# Patient Record
Sex: MALE | Race: WHITE | NOT HISPANIC OR LATINO | Employment: OTHER | URBAN - METROPOLITAN AREA
[De-identification: names, ages, dates, MRNs, and addresses within clinical notes are randomized per-mention and may not be internally consistent; named-entity substitution may affect disease eponyms.]

---

## 2017-02-06 DIAGNOSIS — I10 ESSENTIAL HYPERTENSION: ICD-10-CM

## 2017-02-06 RX ORDER — AMLODIPINE BESYLATE 10 MG/1
TABLET ORAL
Qty: 30 TABLET | Refills: 1 | OUTPATIENT
Start: 2017-02-06

## 2017-02-09 ENCOUNTER — PATIENT MESSAGE (OUTPATIENT)
Dept: FAMILY MEDICINE | Facility: CLINIC | Age: 47
End: 2017-02-09

## 2017-02-09 DIAGNOSIS — I10 ESSENTIAL HYPERTENSION: ICD-10-CM

## 2017-02-09 RX ORDER — AMLODIPINE BESYLATE 10 MG/1
TABLET ORAL
Qty: 30 TABLET | Refills: 1 | Status: CANCELLED | OUTPATIENT
Start: 2017-02-09

## 2017-02-09 RX ORDER — HYDROCHLOROTHIAZIDE 25 MG/1
25 TABLET ORAL DAILY
Qty: 30 TABLET | Refills: 11 | OUTPATIENT
Start: 2017-02-09

## 2017-02-09 RX ORDER — AMLODIPINE BESYLATE 10 MG/1
10 TABLET ORAL DAILY
Qty: 30 TABLET | Refills: 2 | Status: SHIPPED | OUTPATIENT
Start: 2017-02-09 | End: 2017-05-09 | Stop reason: SDUPTHER

## 2017-02-09 RX ORDER — IRBESARTAN 150 MG/1
150 TABLET ORAL NIGHTLY
Qty: 30 TABLET | Refills: 2 | Status: SHIPPED | OUTPATIENT
Start: 2017-02-09 | End: 2017-05-15 | Stop reason: SDUPTHER

## 2017-03-28 ENCOUNTER — PATIENT MESSAGE (OUTPATIENT)
Dept: FAMILY MEDICINE | Facility: CLINIC | Age: 47
End: 2017-03-28

## 2017-05-05 DIAGNOSIS — I10 ESSENTIAL HYPERTENSION: ICD-10-CM

## 2017-05-05 RX ORDER — AMLODIPINE BESYLATE 10 MG/1
TABLET ORAL
Qty: 30 TABLET | Refills: 2 | OUTPATIENT
Start: 2017-05-05

## 2017-05-08 ENCOUNTER — PATIENT MESSAGE (OUTPATIENT)
Dept: FAMILY MEDICINE | Facility: CLINIC | Age: 47
End: 2017-05-08

## 2017-05-09 DIAGNOSIS — I10 ESSENTIAL HYPERTENSION: ICD-10-CM

## 2017-05-09 RX ORDER — AMLODIPINE BESYLATE 10 MG/1
10 TABLET ORAL DAILY
Qty: 30 TABLET | Refills: 0 | Status: SHIPPED | OUTPATIENT
Start: 2017-05-09 | End: 2017-05-15 | Stop reason: SDUPTHER

## 2017-05-15 ENCOUNTER — OFFICE VISIT (OUTPATIENT)
Dept: FAMILY MEDICINE | Facility: CLINIC | Age: 47
End: 2017-05-15
Payer: COMMERCIAL

## 2017-05-15 VITALS
WEIGHT: 292.56 LBS | BODY MASS INDEX: 40.96 KG/M2 | TEMPERATURE: 98 F | DIASTOLIC BLOOD PRESSURE: 87 MMHG | HEIGHT: 71 IN | SYSTOLIC BLOOD PRESSURE: 124 MMHG | HEART RATE: 87 BPM

## 2017-05-15 DIAGNOSIS — E66.01 MORBID OBESITY DUE TO EXCESS CALORIES: ICD-10-CM

## 2017-05-15 DIAGNOSIS — Z00.00 ROUTINE HISTORY AND PHYSICAL EXAMINATION OF ADULT: Primary | ICD-10-CM

## 2017-05-15 DIAGNOSIS — I10 ESSENTIAL HYPERTENSION: ICD-10-CM

## 2017-05-15 PROCEDURE — 99999 PR PBB SHADOW E&M-EST. PATIENT-LVL III: CPT | Mod: PBBFAC,,, | Performed by: FAMILY MEDICINE

## 2017-05-15 PROCEDURE — 3074F SYST BP LT 130 MM HG: CPT | Mod: S$GLB,,, | Performed by: FAMILY MEDICINE

## 2017-05-15 PROCEDURE — 99396 PREV VISIT EST AGE 40-64: CPT | Mod: S$GLB,,, | Performed by: FAMILY MEDICINE

## 2017-05-15 PROCEDURE — 3079F DIAST BP 80-89 MM HG: CPT | Mod: S$GLB,,, | Performed by: FAMILY MEDICINE

## 2017-05-15 RX ORDER — IRBESARTAN 150 MG/1
150 TABLET ORAL NIGHTLY
Qty: 30 TABLET | Refills: 5 | Status: CANCELLED | OUTPATIENT
Start: 2017-05-15

## 2017-05-15 RX ORDER — HYDROCHLOROTHIAZIDE 25 MG/1
25 TABLET ORAL DAILY
Qty: 90 TABLET | Refills: 3 | Status: SHIPPED | OUTPATIENT
Start: 2017-05-15 | End: 2018-06-29 | Stop reason: ALTCHOICE

## 2017-05-15 RX ORDER — AMLODIPINE BESYLATE 10 MG/1
10 TABLET ORAL DAILY
Qty: 90 TABLET | Refills: 3 | Status: SHIPPED | OUTPATIENT
Start: 2017-05-15 | End: 2018-07-05 | Stop reason: SDUPTHER

## 2017-05-15 RX ORDER — IRBESARTAN 150 MG/1
150 TABLET ORAL NIGHTLY
Qty: 90 TABLET | Refills: 3 | Status: SHIPPED | OUTPATIENT
Start: 2017-05-15 | End: 2018-02-16 | Stop reason: ALTCHOICE

## 2017-05-15 NOTE — MR AVS SNAPSHOT
[facility]  [address] [address]  Phone: [phone]  Fax: [phone]                  Yoan Thomason   5/15/2017 8:40 AM   Office Visit    Description:  Male : 1970   Provider:  Stefan Deleon MD   Department:  [facility]           Reason for Visit     Annual Exam           Diagnoses this Visit        Comments    Routine history and physical examination of adult    -  Primary     Essential hypertension         Morbid obesity due to excess calories                To Do List           Goals (5 Years of Data)     None       These Medications        Disp Refills Start End    amlodipine (NORVASC) 10 MG tablet 90 tablet 3 5/15/2017     Take 1 tablet (10 mg total) by mouth once daily. - Oral    Pharmacy: [address] Ph #: [phone]       Notes to Pharmacy: Generic For:NORVASC 10 MG TABLET    hydrochlorothiazide (HYDRODIURIL) 25 MG tablet 90 tablet 3 5/15/2017     Take 1 tablet (25 mg total) by mouth once daily. - Oral    Pharmacy: [address] Ph #: [phone]       Notes to Pharmacy: Generic For:*HYDRODIURIL 25 MG TABLET  2016 1:04:10 PM    irbesartan (AVAPRO) 150 MG tablet 90 tablet 3 5/15/2017     Take 1 tablet (150 mg total) by mouth every evening. - Oral    Pharmacy: [address] Ph #: [phone]       Notes to Pharmacy: Generic For:AVAPRO 150 MG TABLET      [facility]     [facility] Care Line - 24/ Assistance  Unless otherwise directed by your provider, please contact [facility], our nurse care line that is available for  assistance.     Registered nurses in the [facility] provide: appointment scheduling, clinical advisement, health education, and other advisory services.  Call: [phone] (toll free)               Medications          "  Message regarding Medications     Verify the changes and/or additions to your medication regime listed below are the same as discussed with your clinician today.  If any of these changes or additions are incorrect, please notify your healthcare provider.        CHANGE how you are taking these medications     Start Taking Instead of    hydrochlorothiazide (HYDRODIURIL) 25 MG tablet hydrochlorothiazide (HYDRODIURIL) 25 MG tablet    Dosage:  Take 1 tablet (25 mg total) by mouth once daily. Dosage:  TAKE 1 TABLET BY MOUTH ONCE DAILY.    Reason for Change:  Reorder            Verify that the below list of medications is an accurate representation of the medications you are currently taking.  If none reported, the list may be blank. If incorrect, please contact your healthcare provider. Carry this list with you in case of emergency.           Current Medications     amlodipine (NORVASC) 10 MG tablet Take 1 tablet (10 mg total) by mouth once daily.    aspirin (ECOTRIN) 81 MG EC tablet Take 81 mg by mouth once daily.    hydrochlorothiazide (HYDRODIURIL) 25 MG tablet Take 1 tablet (25 mg total) by mouth once daily.    irbesartan (AVAPRO) 150 MG tablet Take 1 tablet (150 mg total) by mouth every evening.           Clinical Reference Information           Your Vitals Were     BP Pulse Temp Height Weight BMI    124/87 87 98.2 °F (36.8 °C) 5' 10.5" (1.791 m) 132.7 kg (292 lb 8.8 oz) 41.38 kg/m2      Blood Pressure          Most Recent Value    BP  124/87      Allergies as of 5/15/2017     No Known Allergies      Immunizations Administered on Date of Encounter - 5/15/2017     None      Orders Placed During Today's Visit     Future Labs/Procedures Expected by Expires    Basic metabolic panel  5/15/2017 1/22/2031    Lipid panel  5/15/2017 (Approximate) 5/15/2018      Language Assistance Services     ATTENTION: Language assistance services are available, free of charge. Please call [phone].      ATENCIÓN: Bridgett pickering, " tiene a cortez disposición servicios gratuitos de asistencia lingüística. Llmatt al [phone].     JENNIFER Ý: N?u b?n nói Ti?ng Vi?t, có các d?ch v? h? tr? ngôn ng? mi?n phí dành cho b?n. G?i s? [phone].         [facility] complies with applicable Federal civil rights laws and does not discriminate on the basis of race, color, national origin, age, disability, or sex.

## 2017-05-16 NOTE — PROGRESS NOTES
Patient presents physical exam follow-up hypertension, morbid obesity.  Difficulty losing weight.  Compliant with medication.    Past Medical History:  Past Medical History:   Diagnosis Date    Morbid obesity 5/15/2017     Past Surgical History:   Procedure Laterality Date    sinus surgery x5 yrs       Review of patient's allergies indicates:  No Known Allergies  Current Outpatient Prescriptions on File Prior to Visit   Medication Sig Dispense Refill    aspirin (ECOTRIN) 81 MG EC tablet Take 81 mg by mouth once daily.      [DISCONTINUED] amlodipine (NORVASC) 10 MG tablet Take 1 tablet (10 mg total) by mouth once daily. 30 tablet 0    [DISCONTINUED] hydrochlorothiazide (HYDRODIURIL) 25 MG tablet TAKE 1 TABLET BY MOUTH ONCE DAILY. 30 tablet 11    [DISCONTINUED] irbesartan (AVAPRO) 150 MG tablet Take 1 tablet (150 mg total) by mouth every evening. 30 tablet 2     No current facility-administered medications on file prior to visit.      Social History     Social History    Marital status:      Spouse name: N/A    Number of children: N/A    Years of education: N/A     Occupational History    Not on file.     Social History Main Topics    Smoking status: Never Smoker    Smokeless tobacco: Not on file    Alcohol use No    Drug use: No    Sexual activity: Not on file     Other Topics Concern    Not on file     Social History Narrative     Family History   Problem Relation Age of Onset    Diabetes Mother     Hypertension Father          ROS:  GENERAL: No fever, chills.  Positive weight gain  HEENT: No headache or hearing complaints.  No dysphagia  Eyes: No vision complaints  CHEST: Denies PERERA, cyanosis, wheezing, cough and sputum production.  CARDIOVASCULAR: Denies chest pain, PND, orthopnea or reduced exercise tolerance.  ABDOMEN: Appetite fine. Denies diarrhea, abdominal pain, hematemesis or blood in stool.  URINARY: No flank pain, dysuria or hematuria.  MUSCULOSKELETAL: No warmth swelling or  "tenderness of the joints  NEUROLOGIC: No focal weakness numbness or paresthesia  PSYCHIATRIC: Denies depression        OBJECTIVE:     Vitals:    05/15/17 0846   BP: 124/87   Pulse: 87   Temp: 98.2 °F (36.8 °C)   Weight: 132.7 kg (292 lb 8.8 oz)   Height: 5' 10.5" (1.791 m)     Wt Readings from Last 3 Encounters:   05/15/17 132.7 kg (292 lb 8.8 oz)   05/05/16 131.5 kg (290 lb)   07/15/15 131.1 kg (289 lb)     APPEARANCE: Well nourished, well developed, in no acute distress.    HEAD: Normocephalic.  Atraumatic.  No sinus tenderness.  EYES:   Right eye: Pupil reactive.  Conjunctiva clear.    Left eye: Pupil reactive.  Conjunctiva clear.    Both fundi:  Grossly normal to nondilated exam. EOMI.    EARS: TM's intact. Light reflex normal. No retraction or perforation.    NOSE:  clear.  MOUTH & THROAT:  No pharyngeal erythema or exudate. No lesions.  NECK: Supple. No bruits.  No JVD.  No cervical lymphadenopathy.  No thyromegaly.    CHEST: Breath sounds clear bilaterally.  Normal respiratory effort  CARDIOVASCULAR: Normal rate.  Regular rhythm.  No murmurs.  No rub.  No gallops.  ABDOMEN: Bowel sounds normal.  Soft.  No tenderness.  No organomegaly.  PERIPHERAL VASCULAR: No cyanosis.  No clubbing.  No edema.  NEUROLOGIC: No focal findings.  MENTAL STATUS: Alert.  Oriented x 3.      Yoan was seen today for annual exam.    Diagnoses and all orders for this visit:    Routine history and physical examination of adult  -     Lipid panel; Future  -     Basic metabolic panel; Future  -     Lipid panel  -     Basic metabolic panel    Essential hypertension  -     amlodipine (NORVASC) 10 MG tablet; Take 1 tablet (10 mg total) by mouth once daily.  -     Lipid panel; Future  -     Basic metabolic panel; Future  -     Lipid panel  -     Basic metabolic panel    Morbid obesity due to excess calories    Other orders  -     Cancel: irbesartan (AVAPRO) 150 MG tablet; Take 1 tablet (150 mg total) by mouth every evening.  -     " hydrochlorothiazide (HYDRODIURIL) 25 MG tablet; Take 1 tablet (25 mg total) by mouth once daily.  -     irbesartan (AVAPRO) 150 MG tablet; Take 1 tablet (150 mg total) by mouth every evening.      Anticipatory guidance: Don't smoke.  Healthy diet and regular exercise recommended.  Avoid sweetened drinks

## 2017-05-26 LAB
BUN SERPL-MCNC: 15 MG/DL (ref 6–24)
BUN/CREAT SERPL: 17 (ref 9–20)
CALCIUM SERPL-MCNC: 9.2 MG/DL (ref 8.7–10.2)
CHLORIDE SERPL-SCNC: 98 MMOL/L (ref 96–106)
CHOLEST SERPL-MCNC: 158 MG/DL (ref 100–199)
CO2 SERPL-SCNC: 23 MMOL/L (ref 18–29)
CREAT SERPL-MCNC: 0.86 MG/DL (ref 0.76–1.27)
GLUCOSE SERPL-MCNC: 96 MG/DL (ref 65–99)
HDLC SERPL-MCNC: 39 MG/DL
LDLC SERPL CALC-MCNC: 102 MG/DL (ref 0–99)
POTASSIUM SERPL-SCNC: 4.1 MMOL/L (ref 3.5–5.2)
SODIUM SERPL-SCNC: 140 MMOL/L (ref 134–144)
TRIGL SERPL-MCNC: 83 MG/DL (ref 0–149)
VLDLC SERPL CALC-MCNC: 17 MG/DL (ref 5–40)

## 2017-10-16 ENCOUNTER — PATIENT MESSAGE (OUTPATIENT)
Dept: ADMINISTRATIVE | Facility: OTHER | Age: 47
End: 2017-10-16

## 2017-11-10 ENCOUNTER — PATIENT MESSAGE (OUTPATIENT)
Dept: ADMINISTRATIVE | Facility: OTHER | Age: 47
End: 2017-11-10

## 2017-11-15 ENCOUNTER — PATIENT MESSAGE (OUTPATIENT)
Dept: FAMILY MEDICINE | Facility: CLINIC | Age: 47
End: 2017-11-15

## 2017-11-15 ENCOUNTER — PATIENT OUTREACH (OUTPATIENT)
Dept: OTHER | Facility: OTHER | Age: 47
End: 2017-11-15

## 2017-11-15 ENCOUNTER — TELEPHONE (OUTPATIENT)
Dept: FAMILY MEDICINE | Facility: CLINIC | Age: 47
End: 2017-11-15

## 2017-11-15 DIAGNOSIS — G47.33 OSA (OBSTRUCTIVE SLEEP APNEA): Primary | ICD-10-CM

## 2017-11-15 RX ORDER — LORATADINE 10 MG/1
10 TABLET ORAL DAILY
COMMUNITY
End: 2021-01-28 | Stop reason: ALTCHOICE

## 2017-11-15 NOTE — LETTER
Mili Dobbins PharmD  [address] [place] [address]     Dear Yoan Thomason,    Welcome to the [facility]!         My name is Mili Dobbins PharmD and I am your dedicated Digital Medicine clinician.  As an expert in medication management, I will help ensure that the medications you are taking continue to provide you with the intended benefits.      I am Yael Lo and I will be your health  for the duration of the program.  My  job is to help you identify lifestyle changes to improve your blood pressure control.  We will talk about nutrition, exercise, and other ways that you may be able to adjust your current habits to better your health. Together, we will work to improve your overall health and encourage you to meet your goals for a healthier lifestyle.    What we expect from YOU:    You will need to take blood pressure readings multiple times a week and no less than one reading per week.   It is important that you take your measurements at different times during the day, when possible.     What you should expect from your Digital Medicine Care Team:   We will provide you with education about high blood pressure, including lifestyle changes that could help you to control your blood pressure.   We will review your weekly readings and provide you with monthly blood pressure progress reports after you have been in the program for more than 30 days.   We will send monthly progress reports on your blood pressure control to your physician so they can follow along with your progress as well.    You will be able to reach me by phone at [phone] or through your [facility] account by clicking my name under Care Team on the right side of the home screen.    I look forward to working with you to achieve your blood pressure goals!    Sincerely,    Mili Dobbins PharmD  Your personal clinician    Please visit [email or web address]  to learn more about high blood pressure and what you can do lower your blood pressure.                                                                                           Yoan Thomason  [address] [address]

## 2017-11-15 NOTE — TELEPHONE ENCOUNTER
HTN Digital Medicine Program Medication Reconciliation Outreach    Called patient to introduce him into the HDMP. Reviewed program details including blood pressure goals, technique for taking readings (timing, cuff placement, body positioning, iHealth gabrielle), and what to do in case of emergency. Introduced patient to members of care team (health , clinician, and responsible provider). Verified patient's understanding of [facility] gabrielle use for contacting clinical team and to ensure that iHealth cuff readings continue to transmit by logging in once every 2 weeks. Confirmation text sent and patient received.  Pt has been taking BP readings almost daily and reports no questions or issues with program equipment at this time.  Reports taking all BP medications in PM and has been compliant with all - HCTZ 25 mg, Irbesartan 150 mg & Amlodipine 10mg with no reported side effects.  Has not experienced any s/s of elevated BP.  Has not been tested for PARAMJIT but would like to be contacted by [facility] sleep clinic to see if his insurance will cover study. Msg sent to Dr. Deleon requesting referral.    Screening Questionnaire Review:  1. Depression - not indicated  2. Sleep apnea - yes     PARAMJIT screening results for this patient suggest a high likelihood of sleep apnea, which can contribute to hypertension. Patient has not been previously diagnosed with sleep apnea and is interested in referral at this time.  Msg sent to Dr. Deleon for referral to [facility].    Patient reports the following symptoms of sleep apnea: snoring, periods of not breathing.        Verified the following information with the patient:  1. Medication list  Current Outpatient Prescriptions on File Prior to Visit   Medication Sig Dispense Refill    amlodipine (NORVASC) 10 MG tablet Take 1 tablet (10 mg total) by mouth once daily. 90 tablet 3    hydrochlorothiazide (HYDRODIURIL) 25 MG tablet Take 1 tablet (25 mg total) by mouth once daily. 90 tablet  3    irbesartan (AVAPRO) 150 MG tablet Take 1 tablet (150 mg total) by mouth every evening. 90 tablet 3    [DISCONTINUED] aspirin (ECOTRIN) 81 MG EC tablet Take 81 mg by mouth once daily.       No current facility-administered medications on file prior to visit.        Previous ADRs  NKDA    2. Medication compliance: has been compliant with the medicaiton regimen    3. Medication Allergies: Review of patient's allergies indicates:  No Known Allergies    Explained that our goal is to get his BP consistently below 140/90mmHg.  Patient denies having further questions, concerns. BP is not at goal.       Last 5 Patient Entered Readings Current 30 Day Average: 146/99     Recent Readings 11/14/2017 11/12/2017 11/12/2017 11/12/2017 11/12/2017    Systolic BP (mmHg) 155 155 125 113 145    Diastolic BP (mmHg) 99 101 68 63 109    Pulse 84 106 91 62 88

## 2017-11-15 NOTE — TELEPHONE ENCOUNTER
----- Message from Stefan Deleon MD sent at 11/15/2017 12:37 PM CST -----  Ok to refer  ----- Message -----  From: Tammy Villaseñor RN  Sent: 11/15/2017  10:50 AM  To: Stefan Deleon MD    Pt scored positive on PARAMJIT screening for Digital medicine HTN program and reports symptoms of sleep apnea. Would like referral to [facility] sleep clinic to see if study would be covered by insurance. Thanks!

## 2017-11-15 NOTE — TELEPHONE ENCOUNTER
----- Message from Stfean Deleon MD sent at 11/15/2017 12:37 PM CST -----  Ok to refer  ----- Message -----  From: Tammy Villaseñor RN  Sent: 11/15/2017  10:50 AM  To: Stefan Deleon MD    Pt scored positive on PARAMJIT screening for Digital medicine HTN program and reports symptoms of sleep apnea. Would like referral to [facility] sleep clinic to see if study would be covered by insurance. Thanks!

## 2017-11-17 ENCOUNTER — TELEPHONE (OUTPATIENT)
Dept: FAMILY MEDICINE | Facility: CLINIC | Age: 47
End: 2017-11-17

## 2017-11-20 ENCOUNTER — PATIENT OUTREACH (OUTPATIENT)
Dept: OTHER | Facility: OTHER | Age: 47
End: 2017-11-20

## 2017-11-20 NOTE — PROGRESS NOTES
Last 5 Patient Entered Readings Current 30 Day Average: 140/95     Recent Readings 11/19/2017 11/18/2017 11/16/2017 11/16/2017 11/15/2017    Systolic BP (mmHg) 137 127 139 145 134    Diastolic BP (mmHg) 87 89 94 96 90    Pulse 85 84 91 90 90          Hypertension Digital Medicine Program (HDMP): Health  Introduction    Introduced Mr. Yoan Thomason to HDMP. Discussed health  role and recommended lifestyle modifications.    Diet (i.e. Low sodium, food labels): Patient reports he eats at home and eats fast food. Patient reports he eats chicken sandwiches, chick-muriel-a, and anything to grab while out driving. We discussed trying to look for low sodium options while eating at fast food places.  I will send tips on low sodium eating at fast food restaurants.  Patient reports he reads food labels, does not add any salt to his meals, and gets fresh or frozen vegetables.     Exercise: Patient reports he walks 15 miles for a hunting trip. Patient states he walks a lot for his job.    Alcohol/Tobacco: Patient reports he does not smoke or drink.     Medication Adherence: has been compliant with the medicaiton regimen.    Other goals: Patient reports he wants to focus on getting BP down and lose weight.    Reviewed AHA recommendations:  Limit sodium intake to <2000mg/day  Perform 150 minutes of physical activity per week    Patient is aware of the importance of taking daily BP readings at various times of the day  Patient aware that the health  can be used as a resource for lifestyle modifications to help reduce or maintain a healthy BP  Patient is aware of the importance of medication adherence.  Patient is aware that HDMP team is not available for emergencies. Patient should call 911 or [facility] if one arises.

## 2017-12-08 ENCOUNTER — PATIENT OUTREACH (OUTPATIENT)
Dept: OTHER | Facility: OTHER | Age: 47
End: 2017-12-08

## 2017-12-08 NOTE — PROGRESS NOTES
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 Patient reports adherence to medication regimen with no concerns at this time. He takes all medications every evening at 6 pm for convenience purposes. He denies s/s of hypertension at this time. Patient would like to focus on increasing energy and promoting weight loss as a means to achieve and maintain blood pressure control. Patient interested in obtaining injectable B-12.     Last 5 Patient Entered Readings Current 30 Day Average: 136/91     Recent Readings 12/6/2017 12/2/2017 11/29/2017 11/27/2017 11/27/2017    Systolic BP (mmHg) 138 135 145 139 142    Diastolic BP (mmHg) 99 85 93 80 88    Pulse 83 79 83 82 85        Assessment:  Per 30-day average blood pressure is not well controlled and slightly above goal <130/80 mmHg.     Plan:  · Continue current medication regimen. Encouraged patient to follow low sodium diet.   · Discussed OTC supplements that contain B12 and encouraged patient to contact PCP regarding injectable agents.   · Will continue to monitor and follow-up in a few weeks sooner if needed to assess BP readings and medication regimen in detail per patient request.    Current medication regimen:  Hypertension Medications             amlodipine (NORVASC) 10 MG tablet Take 1 tablet (10 mg total) by mouth once daily.    hydrochlorothiazide (HYDRODIURIL) 25 MG tablet Take 1 tablet (25 mg total) by mouth once daily.    irbesartan (AVAPRO) 150 MG tablet Take 1 tablet (150 mg total) by mouth every evening.

## 2017-12-27 ENCOUNTER — PATIENT OUTREACH (OUTPATIENT)
Dept: OTHER | Facility: OTHER | Age: 47
End: 2017-12-27

## 2017-12-27 NOTE — PROGRESS NOTES
"Last 5 Patient Entered Readings Current 30 Day Average: 135/89     Recent Readings 12/26/2017 12/24/2017 12/24/2017 12/24/2017 12/23/2017    SBP (mmHg) 133 133 131 141 125    DBP (mmHg) 84 86 88 92 92    Pulse 92 82 81 86 83          Hypertension Digital Medicine Program (HDMP): Health  Follow Up    Lifestyle Modifications:    1.Low sodium diet: yes. Patient reports he is watching his sodium intake.  Patient states he has been eating more at home and looking for low sodium options when eating at fast food places. Patient reports he has been eating salads lately.     2.Physical activity: no. Patients reports he has been walking at work but nothing else. Encouraged the patient to try to get increase his physical activity as much as possible.     3.Hypotension/Hypertension symptoms: no. Patient reports he has no s/s of hypo/hypertension.   Frequency/Alleviating factors/Precipitating factors, etc.     4.Patient has been compliant with the medication regimen.     Follow up with Mr. Yoan Thomason completed. No further questions or concerns. I will follow up in a few weeks to assess progress.     Patient states he is doing "fine".   "

## 2018-01-09 ENCOUNTER — PATIENT OUTREACH (OUTPATIENT)
Dept: OTHER | Facility: OTHER | Age: 48
End: 2018-01-09

## 2018-01-09 NOTE — PROGRESS NOTES
HPI:  Called patient for follow-up. Patient states that he is doing well. Has not taken readings because he is in [place]. He would like to be placed on [facility] until next Monday or Tuesday.    Last 5 Patient Entered Readings Current 30 Day Average: 135/89     Recent Readings 1/1/2018 1/1/2018 12/30/2017 12/26/2017 12/24/2017    SBP (mmHg) 147 145 137 133 133    DBP (mmHg) 98 103 88 84 86    Pulse 77 79 87 92 82        Assessment:  Per 30-day average blood pressure is not well controlled. Will assess the need for a medication adjustment once patient returns.     Plan:  Will continue to monitor and follow-up in a few weeks to assess BP readings and medication regimen.     Current medication regimen:  Hypertension Medications             amlodipine (NORVASC) 10 MG tablet Take 1 tablet (10 mg total) by mouth once daily.    hydrochlorothiazide (HYDRODIURIL) 25 MG tablet Take 1 tablet (25 mg total) by mouth once daily.    irbesartan (AVAPRO) 150 MG tablet Take 1 tablet (150 mg total) by mouth every evening.

## 2018-01-18 ENCOUNTER — PATIENT MESSAGE (OUTPATIENT)
Dept: OTHER | Facility: OTHER | Age: 48
End: 2018-01-18

## 2018-01-23 ENCOUNTER — PATIENT OUTREACH (OUTPATIENT)
Dept: OTHER | Facility: OTHER | Age: 48
End: 2018-01-23

## 2018-02-07 NOTE — PROGRESS NOTES
Last 5 Patient Entered Readings                                      Current 30 Day Average: 135/90     Recent Readings 2/7/2018 2/7/2018 2/7/2018 2/4/2018 2/4/2018    SBP (mmHg) 137 131 141 137 128    DBP (mmHg) 96 93 91 88 95    Pulse 84 85 83 94 99

## 2018-02-07 NOTE — PROGRESS NOTES
"Last 5 Patient Entered Readings                                      Current 30 Day Average: 135/90     Recent Readings 2/7/2018 2/7/2018 2/7/2018 2/4/2018 2/4/2018    SBP (mmHg) 137 131 141 137 128    DBP (mmHg) 96 93 91 88 95    Pulse 84 85 83 94 99          Hypertension Digital Medicine Program (HDMP): Health  Follow Up    Lifestyle Modifications:    1.Low sodium diet: yes. Patient reports he has been trying to watch his sodium intake. Patient states he has been not adding salt to his food and trying to prepare his own food than eating out.     2.Physical activity: yes. Patient reports he has been walking at work.     3.Hypotension/Hypertension symptoms: no. Patient report he has  no s/s of hypo/hypertension.   Frequency/Alleviating factors/Precipitating factors, etc.     4.Patient has been compliant with the medication regimen.     Follow up with Mr. Yoan Thomason completed. No further questions or concerns. I will follow up in a few weeks to assess progress.     Patient states he is doing "good". Patient reports he has been in [place] for 50 days now.  Asked patient what time does he take his BP medication to see if he is compliant. Patient states he takes his BP medication between 6-7 pm. I asked patient how he was taking his BP and patient reports proper technique for taking BP readings.  I asked per Krzysztof Dobibns request for the patient to start taking BP readings at random times, so she can get a better understanding of how Mr. Thomason BP is running through out the day before and after he takes BP medication.     "

## 2018-02-16 ENCOUNTER — PATIENT OUTREACH (OUTPATIENT)
Dept: OTHER | Facility: OTHER | Age: 48
End: 2018-02-16

## 2018-02-16 DIAGNOSIS — I10 ESSENTIAL HYPERTENSION: Primary | ICD-10-CM

## 2018-02-16 RX ORDER — IRBESARTAN 300 MG/1
300 TABLET ORAL NIGHTLY
Qty: 90 TABLET | Refills: 3 | Status: SHIPPED | OUTPATIENT
Start: 2018-02-16 | End: 2019-03-21 | Stop reason: SDUPTHER

## 2018-02-16 NOTE — PROGRESS NOTES
"HPI:  Called patient for follow-up. He states that he is doing well. Notes elevated BP and attributes it to increased stress related to hectic work schedule and multiple deadlines. Per patient current schedule and work related travel don't allow him to reduce his sodium intake as much as he should. Patient eats fast food however, he does order items without additional salt. He believes his eating habits and stress level will improve in the next "couple of months". Patient utilizes his family as a reminder to take medications. He expressed concern with adherence if he cannot take all medications at one time. Patient denies s/s of hypertension at this time.      Last 5 Patient Entered Readings                                      Current 30 Day Average: 135/92     Recent Readings 2/16/2018 2/16/2018 2/16/2018 2/16/2018 2/16/2018    SBP (mmHg) 139 151 146 60 152    DBP (mmHg) 109 103 106 49 109    Pulse 85 87 90 91 88        Assessment:  Per 30-day average blood pressure is not well controlled. Will optimize medication regimen at this time.     Plan:  · Increase irbesartan to 300 mg once daily. Patient will begin taking all medications at 8 or 9 PM.  · Encouraged patient to take more readings at varied time so that we can adequately assess blood pressure control.    · Will continue to monitor and follow-up in a few weeks sooner if needed to assess BP readings and medication regimen.     Current medication regimen:  Hypertension Medications             amlodipine (NORVASC) 10 MG tablet Take 1 tablet (10 mg total) by mouth once daily.    hydrochlorothiazide (HYDRODIURIL) 25 MG tablet Take 1 tablet (25 mg total) by mouth once daily.    irbesartan (AVAPRO) 300 MG tablet Take 1 tablet (300 mg total) by mouth every evening.                "

## 2018-03-01 ENCOUNTER — PATIENT OUTREACH (OUTPATIENT)
Dept: OTHER | Facility: OTHER | Age: 48
End: 2018-03-01

## 2018-03-01 NOTE — PROGRESS NOTES
"Last 5 Patient Entered Readings                                      Current 30 Day Average: 138/94     Recent Readings 2/25/2018 2/25/2018 2/25/2018 2/20/2018 2/20/2018    SBP (mmHg) 140 126 152 143 144    DBP (mmHg) 94 93 101 96 103    Pulse 88 88 88 93 102          Hypertension Digital Medicine Program (HDMP): Health  Follow Up    Lifestyle Modifications:    1.Low sodium diet: yes. Patient reports he has been still preparing his own meals and that his wife is helping watch his sodium by using salt alternatives.    2.Physical activity: yes. Patient reports he still walking at work.     3.Hypotension/Hypertension symptoms: no. Patient reports he has no s/s of hypo/hypertension.   Frequency/Alleviating factors/Precipitating factors, etc.     4.Patient has been compliant with the medication regimen. Patient reports he has been taking his BP medication later in the day like his PharmD Mu recommended he do from the last time they spoke to each other.    Follow up with Jyoti Yoan VASQUEZ Paddy completed. No further questions or concerns. I will follow up in a few weeks to assess progress.     Patient states he is doing "good". Asked patient what was causing his BP on 2/25 to be high. Patient states he was standing up and the cuff was not on correctly when he took his BP . Patient reports he took his BP again and did proper technique by siting in a chair and fasting the cuff around his biceps.    "

## 2018-03-13 ENCOUNTER — PATIENT OUTREACH (OUTPATIENT)
Dept: OTHER | Facility: OTHER | Age: 48
End: 2018-03-13

## 2018-03-13 NOTE — PROGRESS NOTES
"HPI:  Called patient for follow-up. States that he is doing well. Per patient, work has been very "hectic" and he has not been taking the increased dose consistently as we had previously discussed. He denies s/s of hypertension at this time.     Last 5 Patient Entered Readings                                      Current 30 Day Average: 141/97     Recent Readings 3/10/2018 3/10/2018 3/10/2018 3/4/2018 3/3/2018    SBP (mmHg) 140 148 146 147 143    DBP (mmHg) 95 100 93 99 89    Pulse 81 82 80 82 69        Assessment:  Per 30-day average blood pressure is not well controlled. Medication regimen optimized at last encounter however, patient not taking full dose consistently.     Plan:  · Continue current regimen. Patient will take amlodipine 10 mg every morning then irbesartan with HCTZ every evening.   · If readings remain elevated will consider switching HCTZ to chlorthalidone for optimal blood pressure control.   · Will continue to monitor and follow-up in 2 to 3 weeks to assess BP readings and medication regimen.     Current medication regimen:  Hypertension Medications             amlodipine (NORVASC) 10 MG tablet Take 1 tablet (10 mg total) by mouth once daily.    hydrochlorothiazide (HYDRODIURIL) 25 MG tablet Take 1 tablet (25 mg total) by mouth once daily.    irbesartan (AVAPRO) 300 MG tablet Take 1 tablet (300 mg total) by mouth every evening.              "

## 2018-03-28 ENCOUNTER — PATIENT OUTREACH (OUTPATIENT)
Dept: OTHER | Facility: OTHER | Age: 48
End: 2018-03-28

## 2018-03-28 NOTE — PROGRESS NOTES
"Patient states he is doing "good". Patient reports he will be going back  to [place], [place] soon.   Last 5 Patient Entered Readings                                      Current 30 Day Average: 142/95     Recent Readings 3/20/2018 3/20/2018 3/10/2018 3/10/2018 3/10/2018    SBP (mmHg) 136 139 140 148 146    DBP (mmHg) 96 95 95 100 93    Pulse 85 85 81 82 80          Hypertension Digital Medicine Program (HDMP): Health  Follow Up    Lifestyle Modifications:    1.Low sodium diet: yes. Patient reports he still watching what he eats and prepares his own food.     2.Physical activity: yes. Patient reports he is still walking a lot at work.     3.Hypotension/Hypertension symptoms: no. Patient reports he has no s/s of hypotension ( dizziness, LH , nausea, or fatigue) with low readings. Patient reports he has no s/s of hypertension(CP, SOB, severe headaches, or change in vision)  with high readings.   Frequency/Alleviating factors/Precipitating factors, etc.     4.Patient has been compliant with the medication regimen.     Follow up with Mr. Yoan Thomason completed. No further questions or concerns.    Assessment : Patient's current 30 day average BP is not at goal. Patient was not taking the full dosage of hydrochlorothiazide recently but now is and not taking enough readings for his PharmD Mu to see how BP is through out the day.     Plan: I will follow up in a few weeks to assess progress on diet and BP readings.     "

## 2018-03-31 ENCOUNTER — PATIENT MESSAGE (OUTPATIENT)
Dept: OTHER | Facility: OTHER | Age: 48
End: 2018-03-31

## 2018-04-10 ENCOUNTER — PATIENT MESSAGE (OUTPATIENT)
Dept: ADMINISTRATIVE | Facility: OTHER | Age: 48
End: 2018-04-10

## 2018-04-18 ENCOUNTER — PATIENT OUTREACH (OUTPATIENT)
Dept: OTHER | Facility: OTHER | Age: 48
End: 2018-04-18

## 2018-05-03 ENCOUNTER — PATIENT OUTREACH (OUTPATIENT)
Dept: OTHER | Facility: OTHER | Age: 48
End: 2018-05-03

## 2018-05-03 NOTE — PROGRESS NOTES
HPI:  Called patient for follow-up. States that he only has a few minutes at this time. Phone disconnected during encounter and return call sent to [facility]. However, the following was discussed:  1. Medication adherence: Patient does endorse missed doses. Feels that he is compliant 70% of the time.   2. Elevated reading on 4/27/18: Patient states that he was rushing. Admits that he does not have time to relax before readings.     Last 5 Patient Entered Readings                                      Current 30 Day Average: 135/91     Recent Readings 4/27/2018 4/26/2018 4/23/2018 4/23/2018 4/17/2018    SBP (mmHg) 140 137 127 126 152    DBP (mmHg) 95 93 91 82 90    Pulse 79 86 83 81 73        Assessment:  Per 30-day average blood pressure is not well controlled however, it has improved since last encounter.      Plan:  · Continue current regimen.  · [employer] message sent to patient requesting call back when available.   · Will continue to monitor and follow-up in 6 weeks to assess BP readings and medication regimen.     Current medication regimen:  Hypertension Medications             amlodipine (NORVASC) 10 MG tablet Take 1 tablet (10 mg total) by mouth once daily.    hydrochlorothiazide (HYDRODIURIL) 25 MG tablet Take 1 tablet (25 mg total) by mouth once daily.    irbesartan (AVAPRO) 300 MG tablet Take 1 tablet (300 mg total) by mouth every evening.

## 2018-05-09 NOTE — PROGRESS NOTES
Last 5 Patient Entered Readings                                      Current 30 Day Average: 132/89     Recent Readings 5/8/2018 5/8/2018 5/7/2018 4/27/2018 4/26/2018    SBP (mmHg) 120 117 122 140 137    DBP (mmHg) 80 73 78 95 93    Pulse 77 80 80 79 86          Digital Medicine: Health  Follow Up    Lifestyle Modifications:    1.Dietary Modifications (Sodium intake <2,000mg/day, food labels, dining out): Patient reports he has been watching his sodium intake very closely.     2.Physical Activity: Patient reports he still walks a lot at work.     3.Medication Therapy: Patient has been compliant with the medication regimen. Patient reports he has been taking his medication on time d/t setting an alarm at a certain for him to take BP medication in the morning and at night.     4.Patient has the following medication side effects/concerns: Patient reports he has no s/s of hypotension ( dizziness, LH, nausea,or fatigue) with low readings. Patient reports he has no s/s of hypertension ( CP, SOB, severe headaches, or change in vision) with high readings.   (Frequency/Alleviating factors/Precipitating factors, etc.)     Follow up with Mr. Yoan Thomason completed. No further questions or concerns. Will continue follow up to achieve health goals.    Patient's current 30 day average BP is not at goal but has improved.

## 2018-05-30 ENCOUNTER — PATIENT OUTREACH (OUTPATIENT)
Dept: OTHER | Facility: OTHER | Age: 48
End: 2018-05-30

## 2018-05-30 NOTE — PROGRESS NOTES
Last 5 Patient Entered Readings                                      Current 30 Day Average: 124/81     Recent Readings 5/23/2018 5/20/2018 5/17/2018 5/17/2018 5/16/2018    SBP (mmHg) 132 121 124 125 127    DBP (mmHg) 84 74 85 90 80    Pulse 78 77 72 77 87        5/30-Digital Medicine: Health  Follow Up  Left voicemail to follow up with Mr. Yoan Thomason.  Current BP average 124/81 mmHg is at goal, [<130/80].      Digital Medicine: Health  Follow Up    Lifestyle Modifications:    1.Dietary Modifications (Sodium intake <2,000mg/day, food labels, dining out): Patient reports he is watching his sodium intake. Patient states he is looking into losing weight by changing his diet. Patient reports he has trouble eating lunch and breakfast on some days when he gets busy at work and likes to snack at night on nuts or dried fruit. Asked patient has he tried bring snacks in the past to bring to work ,patient states he has in the past brought snacks but it has not worked out for him. Patient reports he is interested in the paleo diet and thinks it would help him with weight lose and eating through out the day. Discussed  the paleo diet and what to expect when following the diet. Will give patient more information on the paleo diet and com up with a plan for him to follow this diet if, the patient wants to participate in .     2.Physical Activity: Patient reports he still walking at work.     3.Medication Therapy: Patient has been compliant with the medication regimen. Patient states he is taking medication at a set time everyday.    4.Patient has the following medication side effects/concerns: Patient reports he has no s/s of hypotension ( dizziness, LH, nausea,or fatigue) with low readings. Patient reports he has hypertension (CP, SOB, severe headaches, or change in vision ) with high readings.   (Frequency/Alleviating factors/Precipitating factors, etc.)     Follow up with Mr. Yoan Thomason completed. No  further questions or concerns.     Patient's current 30 day average BP is at goal.    Plan: Will continue follow up to achieve health goals and talk more about the paleo diet. .

## 2018-06-15 ENCOUNTER — PATIENT OUTREACH (OUTPATIENT)
Dept: OTHER | Facility: OTHER | Age: 48
End: 2018-06-15

## 2018-06-15 DIAGNOSIS — I10 ESSENTIAL HYPERTENSION: Primary | ICD-10-CM

## 2018-06-15 NOTE — PROGRESS NOTES
HPI:  Called patient for follow-up. He continues to travel regularly and eats out at least 3 to 4 times weekly. Patient attributes upward trend in blood pressure to increased stress secondary to work. He endorses adherence to medication regimen with no complaints.     Last 5 Patient Entered Readings                                      Current 30 Day Average: 131/88     Recent Readings 6/29/2018 6/26/2018 6/26/2018 6/26/2018 6/24/2018    SBP (mmHg) 135 118 138 148 137    DBP (mmHg) 86 86 95 99 93    Pulse 86 78 80 79 80        Assessment:  Per 30-day average blood pressure is above goal of <130/80 mmHg. Will optimize medication regimen.     Plan:  · Stop hydrochlorothiazide and start chlorthalidone 12.5 mg once daily.  · BMP in 2 weeks. Order faxed to [facility] on [address] ([phone]).   · Will continue to monitor and follow-up in 2 weeks to assess BP readings and medication regimen.     Current medication regimen:  Hypertension Medications             amlodipine (NORVASC) 10 MG tablet Take 1 tablet (10 mg total) by mouth once daily.    chlorthalidone (HYGROTEN) 25 MG Tab Take one-half tablet (12.5 mg) by mouth once daily. May increase to one full tablet (25 mg) in two weeks.    irbesartan (AVAPRO) 300 MG tablet Take 1 tablet (300 mg total) by mouth every evening.

## 2018-06-29 RX ORDER — CHLORTHALIDONE 25 MG/1
TABLET ORAL
Qty: 30 TABLET | Refills: 3 | Status: SHIPPED | OUTPATIENT
Start: 2018-06-29 | End: 2018-11-15 | Stop reason: SDUPTHER

## 2018-07-05 DIAGNOSIS — I10 ESSENTIAL HYPERTENSION: ICD-10-CM

## 2018-07-06 ENCOUNTER — OFFICE VISIT (OUTPATIENT)
Dept: FAMILY MEDICINE | Facility: CLINIC | Age: 48
End: 2018-07-06
Payer: COMMERCIAL

## 2018-07-06 VITALS
TEMPERATURE: 98 F | HEIGHT: 70 IN | SYSTOLIC BLOOD PRESSURE: 140 MMHG | WEIGHT: 297 LBS | DIASTOLIC BLOOD PRESSURE: 83 MMHG | BODY MASS INDEX: 42.52 KG/M2 | HEART RATE: 81 BPM

## 2018-07-06 DIAGNOSIS — G47.33 OSA (OBSTRUCTIVE SLEEP APNEA): ICD-10-CM

## 2018-07-06 DIAGNOSIS — L91.8 SKIN TAG: ICD-10-CM

## 2018-07-06 DIAGNOSIS — I10 ESSENTIAL HYPERTENSION: ICD-10-CM

## 2018-07-06 DIAGNOSIS — Z00.00 ANNUAL PHYSICAL EXAM: Primary | ICD-10-CM

## 2018-07-06 DIAGNOSIS — E66.01 MORBID OBESITY DUE TO EXCESS CALORIES: ICD-10-CM

## 2018-07-06 PROCEDURE — 99999 PR PBB SHADOW E&M-EST. PATIENT-LVL IV: CPT | Mod: PBBFAC,,, | Performed by: NURSE PRACTITIONER

## 2018-07-06 PROCEDURE — 99396 PREV VISIT EST AGE 40-64: CPT | Mod: 25,S$GLB,, | Performed by: NURSE PRACTITIONER

## 2018-07-06 PROCEDURE — 3077F SYST BP >= 140 MM HG: CPT | Mod: CPTII,S$GLB,, | Performed by: NURSE PRACTITIONER

## 2018-07-06 PROCEDURE — 90715 TDAP VACCINE 7 YRS/> IM: CPT | Mod: S$GLB,,, | Performed by: NURSE PRACTITIONER

## 2018-07-06 PROCEDURE — 3079F DIAST BP 80-89 MM HG: CPT | Mod: CPTII,S$GLB,, | Performed by: NURSE PRACTITIONER

## 2018-07-06 PROCEDURE — 90471 IMMUNIZATION ADMIN: CPT | Mod: S$GLB,,, | Performed by: NURSE PRACTITIONER

## 2018-07-06 RX ORDER — HYDROCHLOROTHIAZIDE 25 MG/1
TABLET ORAL
Qty: 90 TABLET | Refills: 3 | OUTPATIENT
Start: 2018-07-06

## 2018-07-06 RX ORDER — AMLODIPINE BESYLATE 10 MG/1
TABLET ORAL
Qty: 90 TABLET | Refills: 0 | Status: SHIPPED | OUTPATIENT
Start: 2018-07-06 | End: 2018-11-15 | Stop reason: SDUPTHER

## 2018-07-06 RX ORDER — HYDROCHLOROTHIAZIDE 25 MG/1
25 TABLET ORAL DAILY
COMMUNITY
End: 2018-07-17 | Stop reason: ALTCHOICE

## 2018-07-06 NOTE — PROGRESS NOTES
Subjective:       Patient ID: Yoan Thomason is a 48 y.o. male.    Chief Complaint: Annual Exam  Pt in today for annual exam. HTN managed by HTN monitoring program; states compliance with medication. PARAMJIT managed with CPAP. Declines prostate exam today. Labs, TDAP due. Colonoscopy UTD. Healthy diet, exercise, healthy weight loss discussed. Pt requests bariatric surgery referral. Pt also requests multiple skin tag removal. Pt has no other complaints today.  Past Medical History:   Diagnosis Date    Morbid obesity 5/15/2017     Social History     Social History    Marital status:      Spouse name: N/A    Number of children: N/A    Years of education: N/A     Occupational History    Not on file.     Social History Main Topics    Smoking status: Never Smoker    Smokeless tobacco: Not on file    Alcohol use No    Drug use: No    Sexual activity: Not on file     Social History Narrative    No narrative on file     Past Surgical History:   Procedure Laterality Date    sinus surgery x5 yrs         HPI  Review of Systems   Constitutional: Negative.  Negative for activity change and unexpected weight change.   HENT: Negative.  Negative for hearing loss, rhinorrhea and trouble swallowing.    Eyes: Negative.  Negative for visual disturbance.   Respiratory: Negative.  Negative for chest tightness and wheezing.    Cardiovascular: Negative.  Negative for chest pain and palpitations.   Gastrointestinal: Negative.  Negative for blood in stool, constipation, diarrhea and vomiting.   Endocrine: Negative.  Negative for polydipsia and polyuria.   Genitourinary: Negative.  Negative for difficulty urinating, hematuria and urgency.   Musculoskeletal: Negative.  Negative for arthralgias, joint swelling and neck pain.   Skin: Negative.    Allergic/Immunologic: Negative.    Neurological: Negative.  Negative for weakness and headaches.   Psychiatric/Behavioral: Negative.  Negative for confusion and dysphoric mood.        Objective:      Physical Exam   Constitutional: He is oriented to person, place, and time. He appears well-developed and well-nourished.   HENT:   Head: Normocephalic.   Right Ear: External ear normal.   Left Ear: External ear normal.   Nose: Nose normal.   Mouth/Throat: Oropharynx is clear and moist.   Eyes: Conjunctivae are normal. Pupils are equal, round, and reactive to light.   Neck: Normal range of motion. Neck supple.   Cardiovascular: Normal rate, regular rhythm and normal heart sounds.    Pulmonary/Chest: Effort normal and breath sounds normal.   Abdominal: Soft. Bowel sounds are normal.   Musculoskeletal: Normal range of motion.   Neurological: He is alert and oriented to person, place, and time.   Skin: Skin is warm and dry. Capillary refill takes 2 to 3 seconds.   Psychiatric: He has a normal mood and affect. His behavior is normal. Judgment and thought content normal.   Nursing note and vitals reviewed.      Assessment:       1. Annual physical exam    2. Essential hypertension    3. PARAMJIT (obstructive sleep apnea)    4. Morbid obesity due to excess calories    5. Skin tag        Plan:           Yoan was seen today for annual exam.    Diagnoses and all orders for this visit:    Annual physical exam  Essential hypertension  PARAMJIT (obstructive sleep apnea)  -     Comprehensive metabolic panel; Future  -     TSH; Future  -     CBC auto differential; Future  -     Lipid panel; Future  -     PSA, Screening; Future  -     (In Office Administered) Tdap Vaccine    Morbid obesity due to excess calories  -     Ambulatory consult to Bariatric Surgery    Skin tag  -     Ambulatory referral to Dermatology

## 2018-07-10 ENCOUNTER — PATIENT OUTREACH (OUTPATIENT)
Dept: OTHER | Facility: OTHER | Age: 48
End: 2018-07-10

## 2018-07-10 NOTE — PROGRESS NOTES
Last 5 Patient Entered Readings                                      Current 30 Day Average: 131/87     Recent Readings 6/29/2018 6/26/2018 6/26/2018 6/26/2018 6/24/2018    SBP (mmHg) 135 118 138 148 137    DBP (mmHg) 86 86 95 99 93    Pulse 86 78 80 79 80        7/10-Digital Medicine: Health  Follow Up  Left voicemail to follow up with Mr. Yoan Thomason.  Current BP average 131/87 mmHg is not at goal, [<130/80].

## 2018-07-12 ENCOUNTER — TELEPHONE (OUTPATIENT)
Dept: DERMATOLOGY | Facility: CLINIC | Age: 48
End: 2018-07-12

## 2018-07-12 NOTE — TELEPHONE ENCOUNTER
LVM for pt that he can be seen by our SAMMI Warren. Advised anyone at the appt desk can assist him.

## 2018-07-12 NOTE — TELEPHONE ENCOUNTER
----- Message from Soham Hagen sent at 7/12/2018  9:23 AM CDT -----  Contact: Seirathermhart  Appointment Request From: Yoan Thomason    With Provider: Other - (see comments)    Would Accept With:Request to schedule a test or procedure    Preferred Date Range: From 7/9/2018 To 7/31/2018    Preferred Times: Any    Reason for visit: Request an Appt    Comments:  Need an appointment with dermatologist about skin tags.

## 2018-07-16 ENCOUNTER — PATIENT OUTREACH (OUTPATIENT)
Dept: OTHER | Facility: OTHER | Age: 48
End: 2018-07-16

## 2018-07-16 NOTE — PROGRESS NOTES
HPI:  Patient returned call for follow-up. Started chlorthalidone on 7/6/18 and tolerating well with no complaints. Unable to keep lab appointment however, he plans to follow-up on Thursday. Discussed readings on 7/11/18 and initial readings were taken with cuff placed over clothing. Reinforced the importance of utilizing appropriate technique. Patient verbalized understanding. Last reading 2 days ago however, it will not transmit. Offered tech support however, patient believes that it is his phone and declined at this time. He plans to assess later today.     Last 5 Patient Entered Readings                                      Current 30 Day Average: 132/87     Recent Readings 7/11/2018 7/11/2018 7/11/2018 6/29/2018 6/26/2018    SBP (mmHg) 129 138 149 135 118    DBP (mmHg) 86 97 101 86 86    Pulse 82 81 80 86 78        Assessment:  Per 30-day average blood pressure is not at goal. Patient adherent to new regimen for 1 week. Will assess effectiveness and labs at next encounter.     Plan:  · Continue current regimen.  · Follow-up with BMP. Encouraged patient to check BP frequently to assess progress.   · Will continue to monitor and follow-up on or around 7/23/18 to discuss labs as requested by patient.     Current medication regimen:  Hypertension Medications             amLODIPine (NORVASC) 10 MG tablet TAKE 1 TABLET BY MOUTH EVERY DAY    chlorthalidone (HYGROTEN) 25 MG Tab Take one-half tablet (12.5 mg) by mouth once daily. May increase to one full tablet (25 mg) in two weeks.    irbesartan (AVAPRO) 300 MG tablet Take 1 tablet (300 mg total) by mouth every evening.

## 2018-07-19 ENCOUNTER — INITIAL CONSULT (OUTPATIENT)
Dept: DERMATOLOGY | Facility: CLINIC | Age: 48
End: 2018-07-19
Payer: COMMERCIAL

## 2018-07-19 ENCOUNTER — PATIENT MESSAGE (OUTPATIENT)
Dept: ADMINISTRATIVE | Facility: OTHER | Age: 48
End: 2018-07-19

## 2018-07-19 DIAGNOSIS — L28.0 LICHEN SIMPLEX CHRONICUS: ICD-10-CM

## 2018-07-19 DIAGNOSIS — D22.9 MULTIPLE NEVI: ICD-10-CM

## 2018-07-19 DIAGNOSIS — L91.8 ACROCHORDON: Primary | ICD-10-CM

## 2018-07-19 PROCEDURE — 11200 RMVL SKIN TAGS UP TO&INC 15: CPT | Mod: S$GLB,,, | Performed by: PHYSICIAN ASSISTANT

## 2018-07-19 PROCEDURE — 99999 PR PBB SHADOW E&M-EST. PATIENT-LVL II: CPT | Mod: PBBFAC,,, | Performed by: PHYSICIAN ASSISTANT

## 2018-07-19 PROCEDURE — 99202 OFFICE O/P NEW SF 15 MIN: CPT | Mod: 25,S$GLB,, | Performed by: PHYSICIAN ASSISTANT

## 2018-07-19 RX ORDER — BETAMETHASONE VALERATE 1.2 MG/G
CREAM TOPICAL 2 TIMES DAILY
Qty: 15 G | Refills: 0 | Status: SHIPPED | OUTPATIENT
Start: 2018-07-19 | End: 2021-01-28 | Stop reason: ALTCHOICE

## 2018-07-19 NOTE — PATIENT INSTRUCTIONS
Betamethasone cream AAA of Right postauricular skin twice daily for up to 3 weeks.  Do not pick or scratch the area.    Skin tags removed today. Keep the areas clean with soap and water daily until healed.    Recommend a mole screening within the next year. Monitor your moles for change in size, shape, or color.

## 2018-07-19 NOTE — LETTER
July 19, 2018      [place]  [address]  [place] [address]           [facility]  [address] [place] [address]  Phone: [phone]  Fax: [phone]          Patient: Yoan Thomason   MR Number: [id number]   YOB: 1970   Date of Visit: 7/19/2018       Dear Maile Paniagua:    Thank you for referring Yoan Thomason to me for evaluation. Attached you will find relevant portions of my assessment and plan of care.    If you have questions, please do not hesitate to call me. I look forward to following Yoan Thomason along with you.    Sincerely,    Briana Bruno PA-C    Enclosure  CC:  No Recipients    If you would like to receive this communication electronically, please contact [email or web address] or [phone] to request more information on [facility] access.    For providers and/or their staff who would like to refer a patient to [facility], please contact us through our one-stop-shop provider referral line, [facility], at [phone].    If you feel you have received this communication in error or would no longer like to receive these types of communications, please e-mail [email or web address]

## 2018-07-19 NOTE — PROGRESS NOTES
Subjective:       Patient ID:  Yoan Thomason is a 48 y.o. male who presents for   Chief Complaint   Patient presents with    Skin Tags     History of Present Illness: The patient presents with chief complaint of skin tags.  Location: bilaterally to underarms  Duration: 2 + years  Signs/Symptoms: none    Prior treatments: none        Review of Systems   Constitutional: Negative for fever and chills.   Gastrointestinal: Positive for nausea. Negative for vomiting.   Skin: Negative for itching, rash and daily sunscreen use.   Hematologic/Lymphatic: Does not bruise/bleed easily.        Objective:    Physical Exam   Constitutional: He appears well-developed and well-nourished. No distress.   Neurological: He is alert and oriented to person, place, and time. He is not disoriented.   Psychiatric: He has a normal mood and affect.   Skin:   Areas Examined (abnormalities noted in diagram):   Head / Face Inspection Performed  Neck Inspection Performed  Chest / Axilla Inspection Performed  Back Inspection Performed  RUE Inspected  LUE Inspection Performed                   Diagram Legend     Erythematous scaling macule/papule c/w actinic keratosis       Vascular papule c/w angioma      Pigmented verrucoid papule/plaque c/w seborrheic keratosis      Yellow umbilicated papule c/w sebaceous hyperplasia      Irregularly shaped tan macule c/w lentigo     1-2 mm smooth white papules consistent with Milia      Movable subcutaneous cyst with punctum c/w epidermal inclusion cyst      Subcutaneous movable cyst c/w pilar cyst      Firm pink to brown papule c/w dermatofibroma      Pedunculated fleshy papule(s) c/w skin tag(s)      Evenly pigmented macule c/w junctional nevus     Mildly variegated pigmented, slightly irregular-bordered macule c/w mildly atypical nevus      Flesh colored to evenly pigmented papule c/w intradermal nevus       Pink pearly papule/plaque c/w basal cell carcinoma      Erythematous hyperkeratotic cursted  plaque c/w SCC      Surgical scar with no sign of skin cancer recurrence      Open and closed comedones      Inflammatory papules and pustules      Verrucoid papule consistent consistent with wart     Erythematous eczematous patches and plaques     Dystrophic onycholytic nail with subungual debris c/w onychomycosis     Umbilicated papule    Erythematous-base heme-crusted tan verrucoid plaque consistent with inflamed seborrheic keratosis     Erythematous Silvery Scaling Plaque c/w Psoriasis     See annotation      Assessment / Plan:        Acrochordon  Reassurance given.  Lesions are benign.  Reassurance provided.  Informed patient that lesions are benign.  After risks, benefits and alternatives explained, including allergic reaction to anesthesia (if given), pain, recurrence, and scar, and verbal consent was obtained, 6 lesions treated with scissor excision.  Patient tolerated well.  Hemostasis achieved with aluminum chloride.  Verbal wound care instructions were given.     Lichen simplex chronicus  -     betamethasone valerate 0.1% (VALISONE) 0.1 % Crea; Apply topically 2 (two) times daily. AAA bid up to 3 weeks for itching. Do not apply to face or folds of skin. for 14 days  Dispense: 15 g; Refill: 0  Start above med. Advised against scratching or rubbing the affected area.    Multiple nevi  Reassurance given.  Lesions appear to benign. AAD mole brochure provided. Discussed ABCDEs of moles. Recommend he schedule a mole screening with Dr. Tong at his convenience for further monitoring.         No Follow-up on file.

## 2018-07-20 ENCOUNTER — PATIENT OUTREACH (OUTPATIENT)
Dept: OTHER | Facility: OTHER | Age: 48
End: 2018-07-20

## 2018-07-20 NOTE — PROGRESS NOTES
Patient called to confirm that I would be able to view the labs that are used from his Wellness visit ordered by his PCP. Confirmed that I will be able to utilize the most recent labs to access regimen. Will continue to monitor and follow-up with patient as discussed.    Last 5 Patient Entered Readings                                      Current 30 Day Average: 133/87     Recent Readings 7/13/2018 7/11/2018 7/11/2018 7/11/2018 6/29/2018    SBP (mmHg) 142 129 138 149 135    DBP (mmHg) 82 86 97 101 86    Pulse 82 82 81 80 86

## 2018-07-21 LAB
ALBUMIN SERPL-MCNC: 4.3 G/DL (ref 3.5–5.5)
ALBUMIN/GLOB SERPL: 1.4 {RATIO} (ref 1.2–2.2)
ALP SERPL-CCNC: 84 IU/L (ref 39–117)
ALT SERPL-CCNC: 18 IU/L (ref 0–44)
AST SERPL-CCNC: 28 IU/L (ref 0–40)
BASOPHILS # BLD AUTO: 0.1 X10E3/UL (ref 0–0.2)
BASOPHILS NFR BLD AUTO: 1 %
BILIRUB SERPL-MCNC: 0.7 MG/DL (ref 0–1.2)
BUN SERPL-MCNC: 17 MG/DL (ref 6–24)
BUN/CREAT SERPL: 16 (ref 9–20)
CALCIUM SERPL-MCNC: 9.1 MG/DL (ref 8.7–10.2)
CHLORIDE SERPL-SCNC: 94 MMOL/L (ref 96–106)
CHOLEST SERPL-MCNC: 183 MG/DL (ref 100–199)
CO2 SERPL-SCNC: 25 MMOL/L (ref 20–29)
CREAT SERPL-MCNC: 1.09 MG/DL (ref 0.76–1.27)
EOSINOPHIL # BLD AUTO: 0.2 X10E3/UL (ref 0–0.4)
EOSINOPHIL NFR BLD AUTO: 3 %
ERYTHROCYTE [DISTWIDTH] IN BLOOD BY AUTOMATED COUNT: 14.8 % (ref 12.3–15.4)
GLOBULIN SER CALC-MCNC: 3 G/DL (ref 1.5–4.5)
GLUCOSE SERPL-MCNC: 113 MG/DL (ref 65–99)
HCT VFR BLD AUTO: 44.2 % (ref 37.5–51)
HDLC SERPL-MCNC: 37 MG/DL
HGB BLD-MCNC: 14.5 G/DL (ref 13–17.7)
IMM GRANULOCYTES # BLD: 0 X10E3/UL (ref 0–0.1)
IMM GRANULOCYTES NFR BLD: 0 %
LDLC SERPL CALC-MCNC: 125 MG/DL (ref 0–99)
LYMPHOCYTES # BLD AUTO: 2.3 X10E3/UL (ref 0.7–3.1)
LYMPHOCYTES NFR BLD AUTO: 26 %
MCH RBC QN AUTO: 25.7 PG (ref 26.6–33)
MCHC RBC AUTO-ENTMCNC: 32.8 G/DL (ref 31.5–35.7)
MCV RBC AUTO: 78 FL (ref 79–97)
MONOCYTES # BLD AUTO: 0.5 X10E3/UL (ref 0.1–0.9)
MONOCYTES NFR BLD AUTO: 5 %
NEUTROPHILS # BLD AUTO: 5.8 X10E3/UL (ref 1.4–7)
NEUTROPHILS NFR BLD AUTO: 65 %
PLATELET # BLD AUTO: 172 X10E3/UL (ref 150–379)
POTASSIUM SERPL-SCNC: 3.8 MMOL/L (ref 3.5–5.2)
PROT SERPL-MCNC: 7.3 G/DL (ref 6–8.5)
PSA SERPL-MCNC: 0.4 NG/ML (ref 0–4)
RBC # BLD AUTO: 5.64 X10E6/UL (ref 4.14–5.8)
SODIUM SERPL-SCNC: 137 MMOL/L (ref 134–144)
TRIGL SERPL-MCNC: 106 MG/DL (ref 0–149)
TSH SERPL DL<=0.005 MIU/L-ACNC: 1.31 UIU/ML (ref 0.45–4.5)
VLDLC SERPL CALC-MCNC: 21 MG/DL (ref 5–40)
WBC # BLD AUTO: 8.9 X10E3/UL (ref 3.4–10.8)

## 2018-07-26 ENCOUNTER — PATIENT OUTREACH (OUTPATIENT)
Dept: OTHER | Facility: OTHER | Age: 48
End: 2018-07-26

## 2018-07-26 DIAGNOSIS — I10 ESSENTIAL HYPERTENSION: Primary | ICD-10-CM

## 2018-07-26 NOTE — PROGRESS NOTES
HPI:  Called patient for follow-up and to review labs. Reports that he has not been taking medication correctly for at least 2 to 3 weeks. Patient's medications are individually packed and he removed amlodipine instead of hydrochlorothiazide upon changing to chlorthalidone. Medication regimen was as follows until Sunday:  · Hydrochlorothiazide 25 mg once daily  · Chlorthalidone 25 mg daily x 3 to 4 days at increased dose  · Irbesartan 300 mg once daily   Patient denies s/s of hypokalemia at this time. Error noticed and corrected after receiving refill.     Last 5 Patient Entered Readings                                      Current 30 Day Average: 132/88     Recent Readings 8/3/2018 8/3/2018 7/27/2018 7/22/2018 7/22/2018    SBP (mmHg) 119 139 135 133 125    DBP (mmHg) 89 97 91 91 93    Pulse 72 72 76 81 80        Patient denies s/s of hypotension (lightheadedness, dizziness, nausea, fatigue) associated with low readings. Instructed patient to inform me if this occurs, patient confirms understanding.    Patient denies s/s of hypertension (SOB, CP, severe headaches, changes in vision) associated with high readings. Instructed patient to go to the ED if BP >180/110 and accompanied by hypertensive s/s, patient confirms understanding.    Assessment:  Per 30-day average blood pressure is not at goal. Elevated readings secondary to medication noncompliance.     Plan:  · Restart amlodipine and continue irbesartan and chlorthalidone. Stop hydrochlorothiazide.  · Reviewed medication regimen in detail with patient.  · Repeat BMP in 1 to 2 weeks.   · Patients health , Yael Lo, will be following up every 3-4 weeks.  · I will continue to monitor regularly and will follow-up in 2 weeks, sooner if blood pressure begins to trend upward or downward.     Patient has my contact information and knows to call with any concerns or clinical changes.      Current medication regimen:  Hypertension Medications              amLODIPine (NORVASC) 10 MG tablet TAKE 1 TABLET BY MOUTH EVERY DAY    chlorthalidone (HYGROTEN) 25 MG Tab Take one-half tablet (12.5 mg) by mouth once daily. May increase to one full tablet (25 mg) in two weeks.    irbesartan (AVAPRO) 300 MG tablet Take 1 tablet (300 mg total) by mouth every evening.

## 2018-08-03 NOTE — PROGRESS NOTES
Last 5 Patient Entered Readings                                      Current 30 Day Average: 132/88     Recent Readings 8/3/2018 8/3/2018 7/27/2018 7/22/2018 7/22/2018    SBP (mmHg) 119 139 135 133 125    DBP (mmHg) 89 97 91 91 93    Pulse 72 72 76 81 80        8/3-Digital Medicine: Health  Follow Up  Left voicemail to follow up with Mr. Yoan Thomason.  Current BP average 132/88 mmHg is not at goal, [<130/80].

## 2018-08-21 NOTE — PROGRESS NOTES
Last 5 Patient Entered Readings                                      Current 30 Day Average: 129/90     Recent Readings 8/18/2018 8/17/2018 8/16/2018 8/15/2018 8/3/2018    SBP (mmHg) 133 122 130 134 119    DBP (mmHg) 93 83 92 91 89    Pulse 79 84 77 88 72        8/21-Digital Medicine: Health  Follow Up  Left voicemail to follow up with Mr. Yoan Thomason.  Current BP average 129/90 mmHg is not at goal, [<130/80].

## 2018-10-01 NOTE — PROGRESS NOTES
Called patient  For a follow-up. Patient states he can not get his BP cuff to sync with his phone. Patient states he sends a BP readings and then two days later message pops up saying his BP reading did not sync.Patient  Reports he opens his my chart at least twice a week. Will place a task for tech support to reach out to him. Asked patient when do he think he is able to repeat labs per request from Krzysztof alexandre. Patient states he will repeat labs if its mandatory.   Last 5 Patient Entered Readings                                      Current 30 Day Average: 129/86     Recent Readings 9/25/2018 9/5/2018 8/28/2018 8/18/2018 8/17/2018    SBP (mmHg) 128 129 126 133 122    DBP (mmHg) 84 88 93 93 83    Pulse 87 87 80 79 84        Digital Medicine: Health  Follow Up    Lifestyle Modifications:    1.Dietary Modifications (Sodium intake <2,000mg/day, food labels, dining out): Patient reports he has started ketogenic diet. Patient states he is watching his fats and only eating good fats like olive oil, avocados, and nuts.     2.Physical Activity: Patient reports she still walks a lot at work.     3.Medication Therapy: Patient has not  been compliant with the medication regimen. Patient states he forgot to take his BP medications a few times but is back on track to taking them consistently. Advised patient to take medications on a set time consistently.     4.Patient has the following medication side effects/concerns: Patient denies nay s/s of hypotension (dizziness, LH,nausea, or fatigue) with low readings. Patient denies any s/s of hypertension (CP,SOB,severe headaches, or change in vision) with low readings.   (Frequency/Alleviating factors/Precipitating factors, etc.)     Follow up with Mr. Yoan Thomason completed. No further questions or concerns.     Patient's current 30 day average BP is elevated.     Plan: Will continue to follow up to achieve health goals.

## 2018-10-01 NOTE — PROGRESS NOTES
Last 5 Patient Entered Readings                                      Current 30 Day Average: 129/86     Recent Readings 9/25/2018 9/5/2018 8/28/2018 8/18/2018 8/17/2018    SBP (mmHg) 128 129 126 133 122    DBP (mmHg) 84 88 93 93 83    Pulse 87 87 80 79 84          10/1/18: HC has open encounter. Placed task to determine when patient can repeat labs. I will continue monitoring and follow-up in 3 weeks, sooner if needed.

## 2018-10-25 ENCOUNTER — PATIENT OUTREACH (OUTPATIENT)
Dept: OTHER | Facility: OTHER | Age: 48
End: 2018-10-25

## 2018-10-25 NOTE — PROGRESS NOTES
Last 5 Patient Entered Readings                                      Current 30 Day Average: 130/86     Recent Readings 10/23/2018 10/23/2018 10/19/2018 10/16/2018 10/16/2018    SBP (mmHg) 130 147 133 134 147    DBP (mmHg) 99 93 85 84 91    Pulse 77 79 87 82 88        10/25-Digital Medicine: Health  Follow Up  Left voicemail to follow up with Mr. Yoan Thomason.  Current BP average 130/86 mmHg is not at goal, [<130/80].

## 2018-10-25 NOTE — LETTER
December 11, 2018     Yoan Thomason  [address] [facility] [id number]       Dear Yoan,    Thank you for enrolling in the [facility]. To participate in our programs, we ask that you submit weekly home readings through your [facility] account and maintain regular contact with your Care Team.  We have not received any data or heard from you in some time.     The Digital Medicine Care Team has attempted to reach you on multiple occasions to determine if you would like to continue participating in the program. While we encourage you to continue participating fully, we understand that circumstances may change.      To continue participating in the program, please contact me at [phone]. If we do not hear back, you will be un-enrolled and your physician will be notified of your decision.    If you have submitted home readings readings during the past 39 days and believe you are receiving this letter in error, please call the Digital Medicine Patient Support Line at [phone] for troubleshooting.      We look forward to hearing from you soon.    Sincerely,     Yael Lo  [facility]

## 2018-10-31 ENCOUNTER — PATIENT MESSAGE (OUTPATIENT)
Dept: ADMINISTRATIVE | Facility: OTHER | Age: 48
End: 2018-10-31

## 2018-11-12 ENCOUNTER — PATIENT OUTREACH (OUTPATIENT)
Dept: OTHER | Facility: OTHER | Age: 48
End: 2018-11-12

## 2018-11-12 NOTE — PROGRESS NOTES
HPI:  Called patient for follow-up. He continues to be concerned about blood pressure cuff. Reviewed technique and asked patient to check blood pressure regularly. Last reading taken at the  [facility] during cuff exchange. Patient endorses adherence to medication regimen with no complaints.     Last 5 Patient Entered Readings                                      Current 30 Day Average: 140/97     Recent Readings 1/3/2019 1/3/2019 12/24/2018 12/23/2018 12/23/2018    SBP (mmHg) 148 141 139 125 140    DBP (mmHg) 96 95 93 88 89    Pulse 94 93 83 81 81        Patient denies s/s of hypotension (lightheadedness, dizziness, nausea, fatigue) associated with low readings. Instructed patient to inform me if this occurs, patient confirms understanding.    Patient denies s/s of hypertension (SOB, CP, severe headaches, changes in vision) associated with high readings. Instructed patient to go to the ED if BP >180/110 and accompanied by hypertensive s/s, patient confirms understanding.    Assessment:  Patient's current 30-day average is not at goal of <130/80 mmHg based on ACC/AHA HTN guidelines. Most readings taken with cuff prior to device exchange.     Plan:  Continue current regimen.  Patients health , Yael Lo, will be following up every 3-4 weeks.   I will continue to monitor regularly and will follow-up in 4 to 5 weeks, sooner if blood pressure begins to trend upward or downward.     Current medication regimen:  Hypertension Medications             amLODIPine (NORVASC) 10 MG tablet TAKE 1 TABLET BY MOUTH EVERY DAY    chlorthalidone (HYGROTEN) 25 MG Tab TAKE ONE-HALF TABLET (12.5 MG) BY MOUTH ONCE DAILY. MAY INCREASE TO ONE FULL TABLET (25 MG) IN TWO WEEKS.    irbesartan (AVAPRO) 300 MG tablet Take 1 tablet (300 mg total) by mouth every evening.        Patient has my contact information and knows to call with any concerns or clinical changes.

## 2018-11-15 DIAGNOSIS — I10 ESSENTIAL HYPERTENSION: ICD-10-CM

## 2018-11-15 RX ORDER — AMLODIPINE BESYLATE 10 MG/1
TABLET ORAL
Qty: 90 TABLET | Refills: 2 | Status: SHIPPED | OUTPATIENT
Start: 2018-11-15 | End: 2019-12-11 | Stop reason: SDUPTHER

## 2018-11-15 RX ORDER — CHLORTHALIDONE 25 MG/1
TABLET ORAL
Qty: 90 TABLET | Refills: 2 | Status: SHIPPED | OUTPATIENT
Start: 2018-11-15 | End: 2019-12-11 | Stop reason: SDUPTHER

## 2018-11-19 NOTE — PROGRESS NOTES
Last 5 Patient Entered Readings                                      Current 30 Day Average: 137/96     Recent Readings 11/2/2018 10/23/2018 10/23/2018 10/19/2018 10/16/2018    SBP (mmHg) 143 130 147 133 134    DBP (mmHg) 97 99 93 85 84    Pulse 81 77 79 87 82        11/19- Called patient about lack of BP readings. Will call next week.

## 2018-11-23 ENCOUNTER — CLINICAL SUPPORT (OUTPATIENT)
Dept: FAMILY MEDICINE | Facility: CLINIC | Age: 48
End: 2018-11-23
Payer: COMMERCIAL

## 2018-11-23 VITALS — HEART RATE: 81 BPM | DIASTOLIC BLOOD PRESSURE: 73 MMHG | SYSTOLIC BLOOD PRESSURE: 127 MMHG

## 2018-11-23 DIAGNOSIS — I10 ESSENTIAL HYPERTENSION: Primary | ICD-10-CM

## 2018-11-23 PROCEDURE — 99999 PR PBB SHADOW E&M-EST. PATIENT-LVL II: CPT | Mod: PBBFAC,,,

## 2018-11-27 NOTE — PROGRESS NOTES
Last 5 Patient Entered Readings                                      Current 30 Day Average: 143/97     Recent Readings 11/2/2018 10/23/2018 10/23/2018 10/19/2018 10/16/2018    SBP (mmHg) 143 130 147 133 134    DBP (mmHg) 97 99 93 85 84    Pulse 81 77 79 87 82        11/27-Called patient about lack of BP readings.. Will call back next week.

## 2018-12-11 NOTE — PROGRESS NOTES
Last 5 Patient Entered Readings                                      Current 30 Day Average:      Recent Readings 11/2/2018 10/23/2018 10/23/2018 10/19/2018 10/16/2018    SBP (mmHg) 143 130 147 133 134    DBP (mmHg) 97 99 93 85 84    Pulse 81 77 79 87 82        12/11- LVM about lack of BP readings. Will send a noncompliance letter today and call back in two weeks.

## 2018-12-20 NOTE — PROGRESS NOTES
Last 5 Patient Entered Readings                                      Current 30 Day Average: 133/96     Recent Readings 12/15/2018 12/15/2018 12/15/2018 11/2/2018 10/23/2018    SBP (mmHg) 133 133 147 143 130    DBP (mmHg) 97 91 99 97 99    Pulse 77 78 76 81 77        12/20- Called about when he will be able to bring cuff to check accuracy of the readings at the doctors. Patient states he will bring the cuff to the doctor tomorrow to compare readings and see if the are the same. Patient states if BP readings are off then he will bring it to the [facility].

## 2018-12-22 ENCOUNTER — PATIENT MESSAGE (OUTPATIENT)
Dept: ADMINISTRATIVE | Facility: OTHER | Age: 48
End: 2018-12-22

## 2019-01-01 ENCOUNTER — PATIENT MESSAGE (OUTPATIENT)
Dept: FAMILY MEDICINE | Facility: CLINIC | Age: 49
End: 2019-01-01

## 2019-01-04 ENCOUNTER — PATIENT OUTREACH (OUTPATIENT)
Dept: OTHER | Facility: OTHER | Age: 49
End: 2019-01-04

## 2019-01-04 NOTE — PROGRESS NOTES
Last 5 Patient Entered Readings                                      Current 30 Day Average: 140/97     Recent Readings 1/3/2019 1/3/2019 12/24/2018 12/23/2018 12/23/2018    SBP (mmHg) 148 141 139 125 140    DBP (mmHg) 96 95 93 88 89    Pulse 94 93 83 81 81        Patient reports he went to the [facility] yesterday and they changed ou the base of the BP cuff machine and took a BP reading to make sure its working. Patient states his BP reading was high yesterday d/t checking them at the [facility]. Patient reports he will bring his BP cuff to Dr. Deleon and compare the BP readings with the office reading to see if they are the same.     Digital Medicine: Health  Follow Up    Lifestyle Modifications:    1.Dietary Modifications (Sodium intake <2,000mg/day, food labels, dining out): Patient reports he ate lot of food for kassi but is back to watching what he eats and his sodium intake.     2.Physical Activity: Patient reports he still walks a lot at work.     3.Medication Therapy: Patient has been compliant with the medication regimen.    4.Patient has the following medication side effects/concerns: Patient denies any s/s of hypotension (dizziness, LH, nausea, or fatigue) with low readings. Patient denies any s/s of hypertension (CP,SOB,severe headaches, or change in vision) with high readings.   (Frequency/Alleviating factors/Precipitating factors, etc.)     Follow up with Mr. Yoan Thomason completed. No further questions or concerns.    Patient's current 30 day average BP is not at goal.     Plan: Will continue to follow up to achieve health goals.

## 2019-02-01 ENCOUNTER — PATIENT MESSAGE (OUTPATIENT)
Dept: FAMILY MEDICINE | Facility: CLINIC | Age: 49
End: 2019-02-01

## 2019-02-13 ENCOUNTER — PATIENT OUTREACH (OUTPATIENT)
Dept: OTHER | Facility: OTHER | Age: 49
End: 2019-02-13

## 2019-02-13 NOTE — PROGRESS NOTES
"HPI:  Called patient for follow-up. He attributes higher readings to missing medication. Reports increased stress related to work and missing medication for 3 days or more several times in the last month. Also, working proper placement of cuff after last office visit. He states that "he is back on track" with adherence and has no additional questions regarding cuff use.     Last 5 Patient Entered Readings                                      Current 30 Day Average: 136/94     Recent Readings 2/9/2019 2/8/2019 2/3/2019 1/31/2019 1/31/2019    SBP (mmHg) 131 131 138 140 141    DBP (mmHg) 88 85 97 98 101    Pulse 96 90 98 80 79        Patient denies s/s of hypotension (lightheadedness, dizziness, nausea, fatigue) associated with low readings. Instructed patient to inform me if this occurs, patient confirms understanding.    Patient denies s/s of hypertension (SOB, CP, severe headaches, changes in vision) associated with high readings. Instructed patient to go to the ED if BP >180/110 and accompanied by hypertensive s/s, patient confirms understanding.    Assessment:  Patient's current 30-day average is not at goal of <130/80 mmHg.     Plan:  Continue current regimen. Discussed taking blood pressure medication daily.   Patients health , Yael Lo, will be following up every 3-4 weeks.   I will continue to monitor regularly and will follow-up in 4 to 5 weeks, sooner if blood pressure begins to trend upward or downward.     Current medication regimen:  Hypertension Medications             amLODIPine (NORVASC) 10 MG tablet TAKE 1 TABLET BY MOUTH EVERY DAY    chlorthalidone (HYGROTEN) 25 MG Tab TAKE ONE-HALF TABLET (12.5 MG) BY MOUTH ONCE DAILY. MAY INCREASE TO ONE FULL TABLET (25 MG) IN TWO WEEKS.    irbesartan (AVAPRO) 300 MG tablet Take 1 tablet (300 mg total) by mouth every evening.        Patient has my contact information and knows to call with any concerns or clinical changes.        "

## 2019-03-21 DIAGNOSIS — I10 ESSENTIAL HYPERTENSION: ICD-10-CM

## 2019-03-21 RX ORDER — IRBESARTAN 300 MG/1
300 TABLET ORAL NIGHTLY
Qty: 90 TABLET | Refills: 2 | Status: SHIPPED | OUTPATIENT
Start: 2019-03-21 | End: 2020-02-10

## 2019-03-26 ENCOUNTER — PATIENT OUTREACH (OUTPATIENT)
Dept: OTHER | Facility: OTHER | Age: 49
End: 2019-03-26

## 2019-03-26 NOTE — PROGRESS NOTES
HPI:  Called Mr. Yoan Thomason for hypertension follow-up. Patient reports better adherence to medication however, he does still miss at least 2 dose per week. Worked with his pharmacy to align dates and times of prepackaged medications and feels that this will help with adherence. He has no additional questions or concerns at this time.     Last 5 Patient Entered Readings                                      Current 30 Day Average: 125/89     Recent Readings 3/25/2019 3/19/2019 3/13/2019 3/7/2019 3/7/2019    SBP (mmHg) 128 126 122 132 130    DBP (mmHg) 94 91 81 86 91    Pulse 89 95 96 81 81        Patient denies s/s of hypotension (lightheadedness, dizziness, nausea, fatigue) associated with low readings. Instructed patient to inform me if this occurs, patient confirms understanding.    Patient denies s/s of hypertension (SOB, CP, severe headaches, changes in vision) associated with high readings. Instructed patient to go to the ED if BP >180/110 and accompanied by hypertensive s/s, patient confirms understanding.    Assessment:  Patient's current 30-day average is slightly above goal of <130/80 mmHg.    Plan:  Continue current regimen.  Stressed the importance of medication adherence for optimal blood pressure control.   Patients health , Yael Lo, will be following up every 3-4 weeks.   I will continue to monitor regularly and will follow-up in 6 weeks, sooner if blood pressure begins to trend upward or downward.     Current medication regimen:  Hypertension Medications             amLODIPine (NORVASC) 10 MG tablet TAKE 1 TABLET BY MOUTH EVERY DAY    chlorthalidone (HYGROTEN) 25 MG Tab TAKE ONE-HALF TABLET (12.5 MG) BY MOUTH ONCE DAILY. MAY INCREASE TO ONE FULL TABLET (25 MG) IN TWO WEEKS.    irbesartan (AVAPRO) 300 MG tablet TAKE 1 TABLET (300 MG TOTAL) BY MOUTH EVERY EVENING.        Patient has my contact information and knows to call with any concerns or clinical changes.

## 2019-04-23 DIAGNOSIS — I10 ESSENTIAL HYPERTENSION: ICD-10-CM

## 2019-04-23 RX ORDER — IRBESARTAN 300 MG/1
300 TABLET ORAL NIGHTLY
Qty: 90 TABLET | Refills: 2 | Status: CANCELLED | OUTPATIENT
Start: 2019-04-23 | End: 2020-01-18

## 2019-04-23 RX ORDER — CHLORTHALIDONE 25 MG/1
TABLET ORAL
Qty: 90 TABLET | Refills: 2 | Status: CANCELLED | OUTPATIENT
Start: 2019-04-23

## 2019-04-23 RX ORDER — AMLODIPINE BESYLATE 10 MG/1
10 TABLET ORAL DAILY
Qty: 90 TABLET | Refills: 2 | Status: CANCELLED | OUTPATIENT
Start: 2019-04-23

## 2019-05-08 ENCOUNTER — PATIENT OUTREACH (OUTPATIENT)
Dept: OTHER | Facility: OTHER | Age: 49
End: 2019-05-08

## 2019-05-08 NOTE — PROGRESS NOTES
Last 5 Patient Entered Readings                                      Current 30 Day Average: 127/90     Recent Readings 5/5/2019 5/5/2019 4/21/2019 4/12/2019 4/8/2019    SBP (mmHg) 131 155 119 133 123    DBP (mmHg) 93 102 91 86 78    Pulse 87 87 76 84 100        Digital Medicine: Health  Follow Up    Lifestyle Modifications:    Dietary Modifications (Sodium intake <2,000mg/day, food labels, dining out):   Patient is trying to maintain a low sodium diet.   Explained that he was out of town last week which threw off his diet but is back home and back on track with following his low sodium diet.    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 Patient stated that as of right now he's only able to walk puppy a few times per week.    Medication Therapy: Patient has been compliant with the medication regimen.    Patient has the following medication side effects/concerns:   (Frequency/Alleviating factors/Precipitating factors, etc.)   Patient didn't mention any side effects of medication he is prescribed.    Follow up with Mr. Yoan Thomason completed. No further questions or concerns. Will continue to follow up to achieve health goals.

## 2019-05-22 ENCOUNTER — PATIENT OUTREACH (OUTPATIENT)
Dept: OTHER | Facility: OTHER | Age: 49
End: 2019-05-22

## 2019-05-22 NOTE — PROGRESS NOTES
Last 5 Patient Entered Readings                                      Current 30 Day Average: 131/94     Recent Readings 5/19/2019 5/17/2019 5/5/2019 5/5/2019 4/21/2019    SBP (mmHg) 119 142 131 155 119    DBP (mmHg) 85 94 93 102 91    Pulse 76 83 87 87 76        Digital Medicine: Health  Follow Up    Lifestyle Modifications:  Dietary Modifications (Sodium intake <2,000mg/day, food labels, dining out):   Patient has been out of town a few times in the past month which has thrown his diet off not having access to his kitchen but now that he's back on home he's working to get himself back on track.    Physical Activity:   Patient stated that he had been out of town a few times in the past month which has thrown his physical activity off but now that he's home he plans to resume walking/training his dog 2-3x per week.     Medication Therapy:   Patient has been compliant with the medication regimen.    Patient has the following medication side effects/concerns: None  (Frequency/Alleviating factors/Precipitating factors, etc.)     Follow up with Mr. Yoan Thomason completed. No further questions or concerns. Will continue to follow up to achieve health goals.    Notes:  Patient stated that he has gotten a new pillow and he has been sleeping very well.  Patient is getting himself back on track after being out of town. Will f/u on exercise and diet at next outreach.

## 2019-06-03 ENCOUNTER — PATIENT MESSAGE (OUTPATIENT)
Dept: ADMINISTRATIVE | Facility: OTHER | Age: 49
End: 2019-06-03

## 2019-06-14 ENCOUNTER — PATIENT OUTREACH (OUTPATIENT)
Dept: OTHER | Facility: OTHER | Age: 49
End: 2019-06-14

## 2019-06-14 NOTE — PROGRESS NOTES
HPI:  Mr. Yoan Thomason returned call for hypertension digital medicine follow-up. He has not been adherent to low sodium diet nor medication regimen secondary to traveling. Discussed ways to increase adherence to medication and patient plans to add back up supply to toiletry bag. Also, encouraged selecting low sodium items when dining out.     Last 5 Patient Entered Readings                                      Current 30 Day Average: 132/90     Recent Readings 6/10/2019 6/8/2019 6/5/2019 5/26/2019 5/19/2019    SBP (mmHg) 123 130 145 133 119    DBP (mmHg) 83 96 96 87 85    Pulse 83 82 79 80 76          Patient denies s/s of hypotension (lightheadedness, dizziness, nausea, fatigue) associated with low readings. Instructed patient to inform me if this occurs, patient confirms understanding.    Patient denies s/s of hypertension (SOB, CP, severe headaches, changes in vision) associated with high readings. Instructed patient to go to the ED if BP >180/110 and accompanied by hypertensive s/s, patient confirms understanding.    Assessment:  Patient's current 30-day average is not goal of <130/80 mmHg.       Plan:  Continue current regimen and reduce sodium intake.    Patients health , Florian Fajardo, will follow-up as scheduled.    I will continue to monitor regularly and will follow-up in 6 to 8 weeks, sooner if blood pressure begins to trend upward or downward.     Current medication regimen:  Hypertension Medications             amLODIPine (NORVASC) 10 MG tablet TAKE 1 TABLET BY MOUTH EVERY DAY    chlorthalidone (HYGROTEN) 25 MG Tab TAKE ONE-HALF TABLET (12.5 MG) BY MOUTH ONCE DAILY. MAY INCREASE TO ONE FULL TABLET (25 MG) IN TWO WEEKS.    irbesartan (AVAPRO) 300 MG tablet TAKE 1 TABLET (300 MG TOTAL) BY MOUTH EVERY EVENING.        Patient has my contact information and knows to call with any concerns or clinical changes.

## 2019-07-03 ENCOUNTER — PATIENT OUTREACH (OUTPATIENT)
Dept: OTHER | Facility: OTHER | Age: 49
End: 2019-07-03

## 2019-07-03 NOTE — PROGRESS NOTES
Last 5 Patient Entered Readings                                      Current 30 Day Average: 134/93     Recent Readings 7/2/2019 6/22/2019 6/15/2019 6/10/2019 6/8/2019    SBP (mmHg) 126 140 138 123 130    DBP (mmHg) 88 97 98 83 96    Pulse 82 75 77 83 82        Digital Medicine: Health  Follow Up    Lifestyle Modifications:  Dietary Modifications (Sodium intake <2,000mg/day, food labels, dining out):   Patient is out of town working on a project and so his diet has been off some. Patient is trying to do his best while he is out of town.  Patient states that project will be done at the end of the month and then he will be home and can get back on track.    Physical Activity:   Patient is doing a lot of walking and moving around a lot while working on this project.   Patient states that project will be done at the end of the month and then he will be home and can start doing his walks with his dog ellen.    Medication Therapy:   Patient has been compliant with the medication regimen.    Patient has the following medication side effects/concerns: none  (Frequency/Alleviating factors/Precipitating factors, etc.)     Follow up with Mr. Yoan Thomason completed. No further questions or concerns. Will continue to follow up to achieve health goals.    Notes:  Patient is out of town working on a project. Will be done at the end of the month and can get back on track when he gets home.

## 2019-07-10 ENCOUNTER — PATIENT MESSAGE (OUTPATIENT)
Dept: FAMILY MEDICINE | Facility: CLINIC | Age: 49
End: 2019-07-10

## 2019-07-31 ENCOUNTER — PATIENT OUTREACH (OUTPATIENT)
Dept: OTHER | Facility: OTHER | Age: 49
End: 2019-07-31

## 2019-07-31 NOTE — PROGRESS NOTES
Last 5 Patient Entered Readings                                      Current 30 Day Average: 127/89     Recent Readings 7/29/2019 7/15/2019 7/10/2019 7/2/2019 6/22/2019    SBP (mmHg) 135 121 125 126 140    DBP (mmHg) 92 90 84 88 97    Pulse 92 81 85 82 75          Digital Medicine: Health  Follow Up    Left voicemail to follow up with Mr. Yoan Thomason.  Current BP average 127/89 mmHg is not at goal, 130/80.  Called cell, LVM. WCB in 2-3 weeks.

## 2019-08-12 ENCOUNTER — PATIENT OUTREACH (OUTPATIENT)
Dept: OTHER | Facility: OTHER | Age: 49
End: 2019-08-12

## 2019-08-12 NOTE — PROGRESS NOTES
"HPI:  Called Mr. Yoan Thomason for hypertension digital medicine follow-up. Patient states that he has been doing well with taking medication consistently. He continues to work on weight loss however, has encountered a plateau. He continues to travel frequently for work and sodium intake is not "ideal".      Last 5 Patient Entered Readings                                      Current 30 Day Average: 129/90     Recent Readings 8/4/2019 7/29/2019 7/15/2019 7/10/2019 7/2/2019    SBP (mmHg) 130 135 121 125 126    DBP (mmHg) 89 92 90 84 88    Pulse 80 92 81 85 82          Patient denies s/s of hypotension (lightheadedness, dizziness, nausea, fatigue) associated with low readings. Instructed patient to inform me if this occurs, patient confirms understanding.    Patient denies s/s of hypertension (SOB, CP, severe headaches, changes in vision) associated with high readings. Instructed patient to go to the ED if BP >180/110 and accompanied by hypertensive s/s, patient confirms understanding.    Assessment:  Patient's current 30-day average is not at goal of <130/80 mmHg.       Plan:  Consider addition of spironolactone. Will discuss when patient gives me a call back  Will follow-up as scheduled if no call back later today.      Current medication regimen:  Hypertension Medications             amLODIPine (NORVASC) 10 MG tablet TAKE 1 TABLET BY MOUTH EVERY DAY    chlorthalidone (HYGROTEN) 25 MG Tab TAKE ONE-HALF TABLET (12.5 MG) BY MOUTH ONCE DAILY. MAY INCREASE TO ONE FULL TABLET (25 MG) IN TWO WEEKS.    irbesartan (AVAPRO) 300 MG tablet TAKE 1 TABLET (300 MG TOTAL) BY MOUTH EVERY EVENING.        Patient has my contact information and knows to call with any concerns or clinical changes.      "

## 2019-08-14 NOTE — PROGRESS NOTES
Last 5 Patient Entered Readings                                      Current 30 Day Average: 129/90     Recent Readings 8/4/2019 7/29/2019 7/15/2019 7/10/2019 7/2/2019    SBP (mmHg) 130 135 121 125 126    DBP (mmHg) 89 92 90 84 88    Pulse 80 92 81 85 82        Digital Medicine: Health  Follow Up    Lifestyle Modifications:    Dietary Modifications (Sodium intake <2,000mg/day, food labels, dining out):   Patient has been on the road so eating healthy has been a challenge but he hs access to a kitchen and has been buying groceries to try to keep his sodium intake down. Patient asked about HCG diet and I mentioned intermittent fasting which he was also interested in. Will do some research and send resources on both.    Physical Activity:   Patient is very active at work and has been walking his dog a few times per week.     Medication Therapy: Patient has been compliant with the medication regimen.    Patient has the following medication side effects/concerns: none  (Frequency/Alleviating factors/Precipitating factors, etc.)     Follow up with Mr. Yoan Thomason completed. No further questions or concerns. Will continue to follow up to achieve health goals.    Notes:  Patient asked for resources on HCG diet and intermittent fasting, Will f/u in 4-6 weeks.

## 2019-08-29 ENCOUNTER — PATIENT OUTREACH (OUTPATIENT)
Dept: OTHER | Facility: OTHER | Age: 49
End: 2019-08-29

## 2019-08-29 NOTE — PROGRESS NOTES
Last 5 Patient Entered Readings                                      Current 30 Day Average: 134/92     Recent Readings 8/22/2019 8/22/2019 8/16/2019 8/16/2019 8/4/2019    SBP (mmHg) 130 142 141 149 130    DBP (mmHg) 90 94 89 100 89    Pulse 83 82 93 97 80        Digital Medicine: Health  Follow Up    Lifestyle Modifications:  Dietary Modifications (Sodium intake <2,000mg/day, food labels, dining out):   Patient had received the resources on HCG diet and intermittent fasting. Patient had not had the chance to look over the resources in depth because of his work schedule but said he had some downtime today and will read through both links and see which one he will decide to do.     Physical Activity:   Patient has been very active, working long hours, and late into the night.     Medication Therapy:  Patient has been compliant with the medication regimen.    Patient has the following medication side effects/concerns: none  (Frequency/Alleviating factors/Precipitating factors, etc.)     Follow up with Mr. Yoan Thomason completed. No further questions or concerns. Will continue to follow up to achieve health goals.    Notes:  Patient attributes higher BP readings to a few stressful situations. Patient helped to plan a conference for local [employer] which had him stressed out while also working on a project for work that had him working late into the night. Reviewed factors that affect both the top and bottom of numbers of a BP reading and patient understood. Told patient I would push next call out a month for him to get a plan together for his diet and a few weeks to implement and see how it is working for him. Will f/u in 4 weeks.

## 2019-09-27 ENCOUNTER — PATIENT OUTREACH (OUTPATIENT)
Dept: OTHER | Facility: OTHER | Age: 49
End: 2019-09-27

## 2019-09-27 NOTE — PROGRESS NOTES
Digital Medicine: Health  Follow-Up    Patient called back. Patient stated that he is doing better with his diet. Still being as active as possible with work. Will f/u in 4-6 weeks.    The history is provided by the patient.     Follow Up  Follow-up reason(s): reading review      Readings are trending down due to lifestyle change.            Diet:       Patient states that he looks to make healthy choices but has to eat out a lot because he is out of town a lot.    Physical Activity:   When asked if exercising, patient responded: yes    Patient participates in the following activities: walking and outdoor activities    Patient gets activity while at work. Walks his dog when he can and looks for ways to be more active throughout the course of the day.     Medication Adherence:       Patient reports no s/s or issues taking medications as prescribed.      [facility]        Encouraged patient to keep up the good work.       There are no preventive care reminders to display for this patient.    Last 5 Patient Entered Readings                                      Current 30 Day Average: 129/84     Recent Readings 9/22/2019 9/13/2019 9/7/2019 8/29/2019 8/22/2019    SBP (mmHg) 118 129 138 132 130    DBP (mmHg) 81 84 91 78 90    Pulse 81 79 80 75 83

## 2019-11-05 ENCOUNTER — PATIENT OUTREACH (OUTPATIENT)
Dept: OTHER | Facility: OTHER | Age: 49
End: 2019-11-05

## 2019-11-08 ENCOUNTER — PATIENT MESSAGE (OUTPATIENT)
Dept: ADMINISTRATIVE | Facility: OTHER | Age: 49
End: 2019-11-08

## 2019-11-12 ENCOUNTER — OFFICE VISIT (OUTPATIENT)
Dept: FAMILY MEDICINE | Facility: CLINIC | Age: 49
End: 2019-11-12
Payer: COMMERCIAL

## 2019-11-12 VITALS
WEIGHT: 302 LBS | TEMPERATURE: 98 F | BODY MASS INDEX: 43.23 KG/M2 | DIASTOLIC BLOOD PRESSURE: 82 MMHG | SYSTOLIC BLOOD PRESSURE: 126 MMHG | HEIGHT: 70 IN | HEART RATE: 81 BPM

## 2019-11-12 DIAGNOSIS — Z12.11 COLON CANCER SCREENING: ICD-10-CM

## 2019-11-12 DIAGNOSIS — I10 ESSENTIAL HYPERTENSION: ICD-10-CM

## 2019-11-12 DIAGNOSIS — G47.33 OSA ON CPAP: ICD-10-CM

## 2019-11-12 DIAGNOSIS — E66.01 MORBID OBESITY: ICD-10-CM

## 2019-11-12 DIAGNOSIS — Z00.00 ANNUAL PHYSICAL EXAM: Primary | ICD-10-CM

## 2019-11-12 DIAGNOSIS — Z12.5 PROSTATE CANCER SCREENING: ICD-10-CM

## 2019-11-12 PROCEDURE — 3079F PR MOST RECENT DIASTOLIC BLOOD PRESSURE 80-89 MM HG: ICD-10-PCS | Mod: CPTII,S$GLB,, | Performed by: NURSE PRACTITIONER

## 2019-11-12 PROCEDURE — 99999 PR PBB SHADOW E&M-EST. PATIENT-LVL IV: CPT | Mod: PBBFAC,,, | Performed by: NURSE PRACTITIONER

## 2019-11-12 PROCEDURE — 99396 PREV VISIT EST AGE 40-64: CPT | Mod: S$GLB,,, | Performed by: NURSE PRACTITIONER

## 2019-11-12 PROCEDURE — 3074F PR MOST RECENT SYSTOLIC BLOOD PRESSURE < 130 MM HG: ICD-10-PCS | Mod: CPTII,S$GLB,, | Performed by: NURSE PRACTITIONER

## 2019-11-12 PROCEDURE — 3079F DIAST BP 80-89 MM HG: CPT | Mod: CPTII,S$GLB,, | Performed by: NURSE PRACTITIONER

## 2019-11-12 PROCEDURE — 99999 PR PBB SHADOW E&M-EST. PATIENT-LVL IV: ICD-10-PCS | Mod: PBBFAC,,, | Performed by: NURSE PRACTITIONER

## 2019-11-12 PROCEDURE — 3074F SYST BP LT 130 MM HG: CPT | Mod: CPTII,S$GLB,, | Performed by: NURSE PRACTITIONER

## 2019-11-12 PROCEDURE — 99396 PR PREVENTIVE VISIT,EST,40-64: ICD-10-PCS | Mod: S$GLB,,, | Performed by: NURSE PRACTITIONER

## 2019-11-12 RX ORDER — WITCH HAZEL 50 %
2000 PADS, MEDICATED (EA) TOPICAL DAILY
COMMUNITY
End: 2023-02-27

## 2019-11-12 NOTE — PROGRESS NOTES
Subjective:       Patient ID: Yoan Thomason is a 49 y.o. male.    Chief Complaint: Annual Exam  Pt in today for annual exam. HTN managed by HTN monitoring program; states compliance with medication. PARAMJIT managed with CPAP. Declines prostate exam today. Labs, colonoscopy due. Declines influenza vaccine. Healthy diet, exercise, healthy weight loss discussed. Pt has no other complaints today.  Past Medical History:   Diagnosis Date    Morbid obesity 5/15/2017     Social History     Socioeconomic History    Marital status:      Spouse name: Not on file    Number of children: Not on file    Years of education: Not on file    Highest education level: Not on file   Occupational History    Not on file   Social Needs    Financial resource strain: Not on file    Food insecurity:     Worry: Not on file     Inability: Not on file    Transportation needs:     Medical: Not on file     Non-medical: Not on file   Tobacco Use    Smoking status: Never Smoker   Substance and Sexual Activity    Alcohol use: No    Drug use: No    Sexual activity: Not on file   Lifestyle    Physical activity:     Days per week: Not on file     Minutes per session: Not on file    Stress: Not on file   Relationships    Social connections:     Talks on phone: Not on file     Gets together: Not on file     Attends [employer] service: Not on file     Active member of club or organization: Not on file     Attends meetings of clubs or organizations: Not on file     Relationship status: Not on file   Other Topics Concern    Not on file   Social History Narrative    Not on file     Past Surgical History:   Procedure Laterality Date    sinus surgery x5 yrs         HPI  Review of Systems   Constitutional: Negative for activity change.   HENT: Negative for hearing loss, rhinorrhea and trouble swallowing.    Eyes: Negative for visual disturbance.   Respiratory: Negative for chest tightness and wheezing.    Cardiovascular: Negative for chest  pain and palpitations.   Gastrointestinal: Negative for blood in stool, constipation, diarrhea and vomiting.   Endocrine: Negative for polydipsia and polyuria.   Genitourinary: Negative for difficulty urinating, hematuria and urgency.   Musculoskeletal: Negative for arthralgias, joint swelling and neck pain.   Neurological: Negative for weakness and headaches.   Psychiatric/Behavioral: Negative for confusion and dysphoric mood.       Objective:      Physical Exam    Assessment:       1. Annual physical exam    2. Essential hypertension    3. PARAMJIT on CPAP    4. Morbid obesity    5. Prostate cancer screening    6. Colon cancer screening        Plan:           Yoan was seen today for annual exam.    Diagnoses and all orders for this visit:    Annual physical exam  Essential hypertension  PARAMJIT on CPAP  Morbid obesity  Prostate cancer screening  Colon cancer screening  -     Case request GI: COLONOSCOPY  -     PSA, Screening; Future  -     Lipid panel; Future  -     Comprehensive metabolic panel; Future  -     TSH; Future  -     CBC auto differential; Future

## 2019-11-13 ENCOUNTER — TELEPHONE (OUTPATIENT)
Dept: ENDOSCOPY | Facility: HOSPITAL | Age: 49
End: 2019-11-13

## 2019-11-13 NOTE — TELEPHONE ENCOUNTER

## 2019-11-14 RX ORDER — SODIUM, POTASSIUM,MAG SULFATES 17.5-3.13G
1 SOLUTION, RECONSTITUTED, ORAL ORAL DAILY
Qty: 1 KIT | Refills: 0 | Status: SHIPPED | OUTPATIENT
Start: 2019-11-14 | End: 2019-11-16

## 2019-11-20 NOTE — PROGRESS NOTES
Chart Reviewed. Patient's current 30-day average is approaching goal of <130/80 mmHg per 2017 ACC/AHA HTN guidelines. No medication recommendations at this time. I will continue monitoring and follow-up in 6 weeks, sooner if blood pressure begins to trend upward or downward.

## 2019-11-27 NOTE — PROGRESS NOTES
Digital Medicine: Health  Follow-Up    Patient states that he is doing pretty well. [facility] to be and has been working well for him. 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 Patient realized why his readings were so high and then he saw me calling about said readings and he stated he knew why I was calling. Patient states that he is in a [employer] parking lot and plans to start going there to make better food choices. Will f/u in 4-6 weeks    The history is provided by the patient.     Follow Up  Follow-up reason(s): reading review      Readings are trending up due to lifestyle change.        INTERVENTION(S)  recommended diet modifications and encouragement/support      There are no preventive care reminders to display for this patient.    Last 5 Patient Entered Readings                                      Current 30 Day Average: 129/93     Recent Readings 11/27/2019 11/27/2019 11/27/2019 11/27/2019 11/27/2019    SBP (mmHg) 138 149 143 144 153    DBP (mmHg) 98 101 101 101 106    Pulse 78 78 78 77 80                      Diet Screening   Patient reports eating or drinking the following: fast food    Barriers to a Healthy Diet: no barriers to healthy eating    Patient states that he has been eatign mostly fast food for the last week or so and realized that's why his readings are so high. Patient plans to start going to [employer] for lunch to make better food choices.     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 Encouraged patient to stay as active as possible.     Medication Adherence Screening   He misses doses: never      Patient identified the following reasons for non-compliance: none    Patient reports no s/s or issues taking BP medications as prescribed.       SDOH

## 2019-12-11 DIAGNOSIS — I10 ESSENTIAL HYPERTENSION: ICD-10-CM

## 2019-12-11 RX ORDER — AMLODIPINE BESYLATE 10 MG/1
TABLET ORAL
Qty: 90 TABLET | Refills: 3 | Status: SHIPPED | OUTPATIENT
Start: 2019-12-11 | End: 2021-01-14

## 2019-12-11 RX ORDER — CHLORTHALIDONE 25 MG/1
TABLET ORAL
Qty: 90 TABLET | Refills: 3 | Status: SHIPPED | OUTPATIENT
Start: 2019-12-11 | End: 2021-01-14

## 2019-12-17 ENCOUNTER — PATIENT MESSAGE (OUTPATIENT)
Dept: FAMILY MEDICINE | Facility: CLINIC | Age: 49
End: 2019-12-17

## 2019-12-18 ENCOUNTER — PATIENT MESSAGE (OUTPATIENT)
Dept: ADMINISTRATIVE | Facility: OTHER | Age: 49
End: 2019-12-18

## 2019-12-20 ENCOUNTER — PATIENT MESSAGE (OUTPATIENT)
Dept: FAMILY MEDICINE | Facility: CLINIC | Age: 49
End: 2019-12-20

## 2019-12-20 LAB
ALBUMIN SERPL-MCNC: 4.3 G/DL (ref 3.5–5.5)
ALBUMIN/GLOB SERPL: 1.5 {RATIO} (ref 1.2–2.2)
ALP SERPL-CCNC: 106 IU/L (ref 39–117)
ALT SERPL-CCNC: 12 IU/L (ref 0–44)
AST SERPL-CCNC: 20 IU/L (ref 0–40)
BASOPHILS # BLD AUTO: 0.1 X10E3/UL (ref 0–0.2)
BASOPHILS NFR BLD AUTO: 1 %
BILIRUB SERPL-MCNC: 0.5 MG/DL (ref 0–1.2)
BUN SERPL-MCNC: 17 MG/DL (ref 6–24)
BUN/CREAT SERPL: 20 (ref 9–20)
CALCIUM SERPL-MCNC: 9.4 MG/DL (ref 8.7–10.2)
CHLORIDE SERPL-SCNC: 97 MMOL/L (ref 96–106)
CHOLEST SERPL-MCNC: 157 MG/DL (ref 100–199)
CO2 SERPL-SCNC: 26 MMOL/L (ref 20–29)
CREAT SERPL-MCNC: 0.84 MG/DL (ref 0.76–1.27)
EOSINOPHIL # BLD AUTO: 0.2 X10E3/UL (ref 0–0.4)
EOSINOPHIL NFR BLD AUTO: 2 %
ERYTHROCYTE [DISTWIDTH] IN BLOOD BY AUTOMATED COUNT: 14.5 % (ref 12.3–15.4)
GLOBULIN SER CALC-MCNC: 2.8 G/DL (ref 1.5–4.5)
GLUCOSE SERPL-MCNC: 99 MG/DL (ref 65–99)
HCT VFR BLD AUTO: 44.4 % (ref 37.5–51)
HDLC SERPL-MCNC: 41 MG/DL
HGB BLD-MCNC: 14.2 G/DL (ref 13–17.7)
IMM GRANULOCYTES # BLD AUTO: 0 X10E3/UL (ref 0–0.1)
IMM GRANULOCYTES NFR BLD AUTO: 0 %
LDLC SERPL CALC-MCNC: 95 MG/DL (ref 0–99)
LYMPHOCYTES # BLD AUTO: 2.5 X10E3/UL (ref 0.7–3.1)
LYMPHOCYTES NFR BLD AUTO: 27 %
MCH RBC QN AUTO: 25.8 PG (ref 26.6–33)
MCHC RBC AUTO-ENTMCNC: 32 G/DL (ref 31.5–35.7)
MCV RBC AUTO: 81 FL (ref 79–97)
MONOCYTES # BLD AUTO: 0.4 X10E3/UL (ref 0.1–0.9)
MONOCYTES NFR BLD AUTO: 5 %
NEUTROPHILS # BLD AUTO: 6 X10E3/UL (ref 1.4–7)
NEUTROPHILS NFR BLD AUTO: 65 %
PLATELET # BLD AUTO: 178 X10E3/UL (ref 150–450)
POTASSIUM SERPL-SCNC: 3.7 MMOL/L (ref 3.5–5.2)
PROT SERPL-MCNC: 7.1 G/DL (ref 6–8.5)
PSA SERPL-MCNC: 0.4 NG/ML (ref 0–4)
RBC # BLD AUTO: 5.51 X10E6/UL (ref 4.14–5.8)
SODIUM SERPL-SCNC: 141 MMOL/L (ref 134–144)
TRIGL SERPL-MCNC: 103 MG/DL (ref 0–149)
TSH SERPL DL<=0.005 MIU/L-ACNC: 1.6 UIU/ML (ref 0.45–4.5)
VLDLC SERPL CALC-MCNC: 21 MG/DL (ref 5–40)
WBC # BLD AUTO: 9.2 X10E3/UL (ref 3.4–10.8)

## 2020-01-03 ENCOUNTER — PATIENT OUTREACH (OUTPATIENT)
Dept: OTHER | Facility: OTHER | Age: 50
End: 2020-01-03

## 2020-01-03 NOTE — PROGRESS NOTES
Digital Medicine: Health  Follow-Up    Patient reports that he is doing pretty well. 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  Patient has plans to get back on track with his diet the week after next. Patient is on the road for work and will be home in a week. Patient states that he is going to start going to the gym with his daughter and was inquiring about the best types of exercises for patient. Let patient know that starting off he should look for total body workouts splitting his days between upper body, lower body, and a cardio focused day. Explained to the patient the difference between using a bike, walking the treadmill, and a stair climber and which would be most beneficial for patient. Patient also mentioned wear and tear on his knees and I mentioned that the bike would be the least amount of impact but if he had access to a pool then that would create the least amount of impact on his joints. Will f/u in 4 weeks to check in on progress.    The history is provided by the patient.     Follow Up  Follow-up reason(s): reading review      Readings are trending down due to lifestyle change.    Routine Education Topics: weight management, meal planning and physical activity        INTERVENTION(S)  recommended diet modifications, recommend physical activity and encouragement/support      There are no preventive care reminders to display for this patient.    Last 5 Patient Entered Readings                                      Current 30 Day Average: 140/95     Recent Readings 12/31/2019 12/31/2019 12/18/2019 12/18/2019 12/18/2019    SBP (mmHg) 134 136 145 139 139    DBP (mmHg) 92 96 91 93 102    Pulse 79 76 78 79 79                      Diet Screening   No change to diet.      Eating style: travels frequently    Barriers to a Healthy Diet: time/convenience    Patient is no longer doing mostly fast food.  Patient will be on the road for a week and will be  looking for healthy options while he is there and plans to be more aware of his sodium intake to bring DBP down.    Intervention(s): meal planning    Physical Activity Screening   When asked if exercising, patient responded: yes    Patient participates in the following activities: walking and yard/housework    He identified the following barriers to physical activity: lack of time    Patient plans to start working out with his daughter when he gets back home next week from his work trip. Patient plans to start off with an upper body day a lower body day and a cardio focused day. Gave some insight to patient about the difference between the different types of cardio machines: bike, treadmill, stair climber    Medication Adherence Screening   He misses doses: never      Patient identified the following reasons for non-compliance: none    Patient reports no s/s or issues taking BP medications as prescribed.       SDOH

## 2020-01-16 RX ORDER — SODIUM, POTASSIUM,MAG SULFATES 17.5-3.13G
SOLUTION, RECONSTITUTED, ORAL ORAL
Qty: 354 ML | Refills: 0 | Status: ON HOLD | OUTPATIENT
Start: 2020-01-16 | End: 2020-01-20 | Stop reason: ALTCHOICE

## 2020-01-20 ENCOUNTER — ANESTHESIA (OUTPATIENT)
Dept: ENDOSCOPY | Facility: HOSPITAL | Age: 50
End: 2020-01-20
Payer: COMMERCIAL

## 2020-01-20 ENCOUNTER — HOSPITAL ENCOUNTER (OUTPATIENT)
Facility: HOSPITAL | Age: 50
Discharge: HOME | End: 2020-01-20
Attending: SURGERY | Admitting: SURGERY
Payer: COMMERCIAL

## 2020-01-20 ENCOUNTER — ANESTHESIA EVENT (OUTPATIENT)
Dept: ENDOSCOPY | Facility: HOSPITAL | Age: 50
End: 2020-01-20
Payer: COMMERCIAL

## 2020-01-20 VITALS
TEMPERATURE: 98 F | BODY MASS INDEX: 43.5 KG/M2 | RESPIRATION RATE: 17 BRPM | WEIGHT: 303.81 LBS | HEART RATE: 74 BPM | DIASTOLIC BLOOD PRESSURE: 79 MMHG | SYSTOLIC BLOOD PRESSURE: 122 MMHG | HEIGHT: 70 IN | OXYGEN SATURATION: 98 %

## 2020-01-20 DIAGNOSIS — Z86.010 PERSONAL HISTORY OF COLONIC POLYPS: Primary | ICD-10-CM

## 2020-01-20 PROCEDURE — 45380 COLONOSCOPY AND BIOPSY: CPT | Performed by: SURGERY

## 2020-01-20 PROCEDURE — 88305 TISSUE EXAM BY PATHOLOGIST: CPT | Performed by: PATHOLOGY

## 2020-01-20 PROCEDURE — 88305 TISSUE EXAM BY PATHOLOGIST: CPT | Mod: 26,,, | Performed by: PATHOLOGY

## 2020-01-20 PROCEDURE — 45385 PR COLONOSCOPY,REMV LESN,SNARE: ICD-10-PCS | Mod: 33,,, | Performed by: SURGERY

## 2020-01-20 PROCEDURE — 45380 PR COLONOSCOPY,BIOPSY: ICD-10-PCS | Mod: 59,,, | Performed by: SURGERY

## 2020-01-20 PROCEDURE — 27201012 HC FORCEPS, HOT/COLD, DISP: Performed by: SURGERY

## 2020-01-20 PROCEDURE — 37000009 HC ANESTHESIA EA ADD 15 MINS: Performed by: SURGERY

## 2020-01-20 PROCEDURE — 27201089 HC SNARE, DISP (ANY): Performed by: SURGERY

## 2020-01-20 PROCEDURE — 63600175 PHARM REV CODE 636 W HCPCS: Performed by: NURSE ANESTHETIST, CERTIFIED REGISTERED

## 2020-01-20 PROCEDURE — 37000008 HC ANESTHESIA 1ST 15 MINUTES: Performed by: SURGERY

## 2020-01-20 PROCEDURE — 45385 COLONOSCOPY W/LESION REMOVAL: CPT | Mod: 33,,, | Performed by: SURGERY

## 2020-01-20 PROCEDURE — 45380 COLONOSCOPY AND BIOPSY: CPT | Mod: 59,,, | Performed by: SURGERY

## 2020-01-20 PROCEDURE — 88305 TISSUE EXAM BY PATHOLOGIST: ICD-10-PCS | Mod: 26,,, | Performed by: PATHOLOGY

## 2020-01-20 PROCEDURE — 45385 COLONOSCOPY W/LESION REMOVAL: CPT | Performed by: SURGERY

## 2020-01-20 RX ORDER — PROPOFOL 10 MG/ML
VIAL (ML) INTRAVENOUS
Status: DISCONTINUED | OUTPATIENT
Start: 2020-01-20 | End: 2020-01-20

## 2020-01-20 RX ORDER — SODIUM CHLORIDE 0.9 % (FLUSH) 0.9 %
10 SYRINGE (ML) INJECTION
Status: DISCONTINUED | OUTPATIENT
Start: 2020-01-20 | End: 2020-01-20 | Stop reason: HOSPADM

## 2020-01-20 RX ORDER — SODIUM CHLORIDE, SODIUM LACTATE, POTASSIUM CHLORIDE, CALCIUM CHLORIDE 600; 310; 30; 20 MG/100ML; MG/100ML; MG/100ML; MG/100ML
INJECTION, SOLUTION INTRAVENOUS CONTINUOUS PRN
Status: DISCONTINUED | OUTPATIENT
Start: 2020-01-20 | End: 2020-01-20

## 2020-01-20 RX ORDER — LIDOCAINE HCL/PF 100 MG/5ML
SYRINGE (ML) INTRAVENOUS
Status: DISCONTINUED | OUTPATIENT
Start: 2020-01-20 | End: 2020-01-20

## 2020-01-20 RX ADMIN — PROPOFOL 50 MG: 10 INJECTION, EMULSION INTRAVENOUS at 09:01

## 2020-01-20 RX ADMIN — PROPOFOL 20 MG: 10 INJECTION, EMULSION INTRAVENOUS at 09:01

## 2020-01-20 RX ADMIN — LIDOCAINE HYDROCHLORIDE 20 MG: 20 INJECTION, SOLUTION INTRAVENOUS at 09:01

## 2020-01-20 RX ADMIN — SODIUM CHLORIDE, SODIUM LACTATE, POTASSIUM CHLORIDE, AND CALCIUM CHLORIDE: 600; 310; 30; 20 INJECTION, SOLUTION INTRAVENOUS at 09:01

## 2020-01-20 NOTE — H&P
Endoscopy H&P    Procedure : Colonoscopy      asymptomatic screening exam, last colonoscopy 5 years ago - benign polyps? No report available.      Past Medical History:   Diagnosis Date    Morbid obesity 5/15/2017             Review of Systems -ROS:  GENERAL: No fever, chills, fatigability or weight loss.  CHEST: Denies PERERA, cyanosis, wheezing, cough and sputum production.  CARDIOVASCULAR: Denies chest pain, PND, orthopnea or reduced exercise tolerance.   Musculoskeletal ROS: negative for - gait disturbance or joint pain  Neurological ROS: negative for - confusion or memory loss        Physical Exam:  General: well developed, well nourished, no distress  Head: normocephalic  Neck: supple, symmetrical, trachea midline  Lungs:  clear to auscultation bilaterally and normal respiratory effort  Heart: regular rate and rhythm, S1, S2 normal, no murmur, rub or gallop and regular rate and rhythm  Abdomen: soft, non-tender non-distented; bowel sounds normal; no masses,  no organomegaly  Extremities: no cyanosis or edema, or clubbing    ASA : II    IMP: asymptomatic screening exam    Plan: Colonoscopy with Moderate sedation.  I have explained the procedure including indications, alternatives, expected outcomes and potential complications. The patient appears to understand and gives informed consent. The patient is medically ready for surgery.        Joellen West MD

## 2020-01-20 NOTE — DISCHARGE INSTRUCTIONS
Understanding Colon and Rectal Polyps    The colon (also called the large intestine) is a muscular tube that forms the last part of the digestive tract. It absorbs water and stores food waste. The colon is about 4 to 6 feet long. The rectum is the last 6 inches of the colon. The colon and rectum have a smooth lining composed of millions of cells. Changes in these cells can lead to growths in the colon that can become cancerous and should be removed. Multiple tests are available to screen for colon cancer, but the colonoscopy is the most recommended test. During colonoscopy, these polyps can be removed. How often you need this test depends on many things including your condition, your family history, symptoms, and what the findings were at the previous colonoscopy.   When the colon lining changes  Changes that happen in the cells that line the colon or rectum can lead to growths called polyps. Over a period of years, polyps can turn cancerous. Removing polyps early may prevent cancer from ever forming.  Polyps  Polyps are fleshy clumps of tissue that form on the lining of the colon or rectum. Small polyps are usually benign (not cancerous). However, over time, cells in a polyp can change and become cancerous. Certain types of polyps known as adenomatous polyps are premalignant. The risk for invasive cancer increases with the size of the polyp and certain cell and gene features. This means that they can become cancerous if they're not removed. Hyperplastic polyps are benign. They can grow quite large and not turn cancerous.   Cancer  Almost all colorectal cancers start when polyp cells begin growing abnormally. As a cancerous tumor grows, it may involve more and more of the colon or rectum. In time, cancer can also grow beyond the colon or rectum and spread to nearby organs or to glands called lymph nodes. The cells can also travel to other parts of the body. This is known as metastasis. The earlier a cancerous  tumor is removed, the better the chance of preventing its spread.    Date Last Reviewed: 8/1/2016  © 6395-8512 [employer]. [address] [place] [address], [place] [address]. All rights reserved. This information is not intended as a substitute for professional medical care. Always follow your healthcare professional's instructions.        Hemorrhoids    Hemorrhoids are swollen and inflamed veins inside the rectum and near the anus. The rectum is the last several inches of the colon. The anus is the passage between the rectum and the outside of the body.  Causes  The veins can become swollen due to increased pressure in them. This is most often caused by:  · Chronic constipation or diarrhea  · Straining when having a bowel movement  · Sitting too long on the toilet  · A low-fiber diet  · Pregnancy  Symptoms  · Bleeding from the rectum (this may be noticeable after bowel movements)  · Lump near the anus  · Itching around the anus  · Pain around the anus  There are different types of hemorrhoids. Depending on the type you have and the severity, you may be able to treat yourself at home. In some cases, a procedure may be the best treatment option. Your healthcare provider can tell you more about this, if needed.  Home care  General care  · To get relief from pain or itching, try:  ¨ Topical products. Your healthcare provider may prescribe or recommend creams, ointments, or pads that can be applied to the hemorrhoid. Use these exactly as directed.  ¨ Medicines. Your healthcare provider may recommend stool softeners, suppositories, or laxatives to help manage constipation. Use these exactly as directed.  ¨ Sitz baths. A sitz bath involves sitting in a few inches of warm bath water. Be careful not to make the water so hot that you burn yourself--test it before sitting in it. Soak for about 10 to 15 minutes a few times a day. This may help relieve pain.  Tips to help prevent hemorrhoids  · Eat more fiber. Fiber adds  bulk to stool and absorbs water as it moves through your colon. This makes stool softer and easier to pass.  ¨ Increase the fiber in your diet with more fiber-rich foods. These include fresh fruit, vegetables, and whole grains.  ¨ Take a fiber supplement or bulking agent, if advised to by your provider. These include products such as psyllium or methylcellulose.  · Drink plenty of water, if directed to by your provider. This can help keep stool soft.  · Be more active. Frequent exercise aids digestion and helps prevent constipation. It may also help make bowel movements more regular.  · Dont strain during bowel movements. This can make hemorrhoids more likely. Also, dont sit on the toilet for long periods of time.  Follow-up care  Follow up with your healthcare provider, or as advised. If a culture or imaging tests were done, you will be notified of the results when they are ready. This may take a few days or longer.  When to seek medical advice  Call your healthcare provider right away if any of these occur:  · Increased bleeding from the rectum  · Increased pain around the rectum or anus  · Weakness or dizziness  Call 911  Call 911 or return to the emergency department right away if any of these occur:  · Trouble breathing or swallowing  · Fainting or loss of consciousness  · Unusually fast heart rate  · Vomiting blood  · Large amounts of blood in stool  Date Last Reviewed: 6/22/2015  © 5374-0862 [employer]. [address] [place] [address], [place] [address]. All rights reserved. This information is not intended as a substitute for professional medical care. Always follow your healthcare professional's instructions.

## 2020-01-20 NOTE — BRIEF OP NOTE
[facility]  Brief Operative Note    Surgery Date: 1/20/2020     Surgeon(s) and Role:     * Joellen West MD - Primary    Assisting Surgeon: None    Pre-op Diagnosis:  Screening [Z13.9]    Post-op Diagnosis:  Post-Op Diagnosis Codes:     * Screening [Z13.9]    Procedure(s) (LRB):  COLONOSCOPY (N/A)    Anesthesia: Choice    Description of the findings of the procedure(s): descending colon polyp, hyperplastic polyps in sigmoid colon, external hemorrhoid    Estimated Blood Loss: * No values recorded between 1/20/2020 12:00 AM and 1/20/2020 10:03 AM *         Specimens:   Specimen (12h ago, onward)    None            Discharge Note    OUTCOME: Patient tolerated treatment/procedure well without complication and is now ready for discharge.    DISPOSITION: Home or Self Care    FINAL DIAGNOSIS:  H/o colon polyps    FOLLOWUP: With primary care provider

## 2020-01-20 NOTE — ANESTHESIA POSTPROCEDURE EVALUATION
Anesthesia Post Evaluation    Patient: Yoan Thomason    Procedure(s) Performed: Procedure(s) (LRB):  COLONOSCOPY (N/A)    Final Anesthesia Type: MAC    Patient location during evaluation: PACU  Patient participation: Yes- Able to Participate  Level of consciousness: awake and alert and oriented  Post-procedure vital signs: reviewed and stable  Pain management: adequate  Airway patency: patent  PARAMJIT mitigation strategies: Multimodal analgesia    Anesthetic complications: no      Cardiovascular status: stable  Respiratory status: unassisted, spontaneous ventilation and room air  Hydration status: euvolemic  Follow-up not needed.          Vitals Value Taken Time   /69 1/20/2020 10:08 AM   Temp 36.7 °C (98.1 °F) 1/20/2020 10:08 AM   Pulse 82 1/20/2020 10:08 AM   Resp 16 1/20/2020 10:08 AM   SpO2 94 % 1/20/2020 10:08 AM         No case tracking events are documented in the log.      Pain/Aaron Score: No data recorded

## 2020-01-20 NOTE — ANESTHESIA RELEASE NOTE
"Anesthesia Release from PACU Note    Patient: Yoan Thomason    Procedure(s) Performed: Procedure(s) (LRB):  COLONOSCOPY (N/A)    Anesthesia type: MAC    Post pain: Adequate analgesia    Post assessment: no apparent anesthetic complications and tolerated procedure well    Last Vitals:   Visit Vitals  /89 (BP Location: Left arm, Patient Position: Lying)   Pulse 78   Temp 36.8 °C (98.2 °F) (Temporal)   Resp 18   Ht 5' 10" (1.778 m)   Wt (!) 137.8 kg (303 lb 12.7 oz)   BMI 43.59 kg/m²       Post vital signs: stable    Level of consciousness: awake, alert  and oriented    Nausea/Vomiting: no nausea/no vomiting    Complications: none    Airway Patency: patent    Respiratory: unassisted, spontaneous ventilation, room air    Cardiovascular: stable and blood pressure at baseline    Hydration: euvolemic  "

## 2020-01-20 NOTE — PLAN OF CARE
Dr West came to bedside and discussed findings. NO N/V,  no abdominal pain, no GI bleeding, and vitals stable.  Pt discharged from unit.

## 2020-01-20 NOTE — TRANSFER OF CARE
"Anesthesia Transfer of Care Note    Patient: Yoan Thomason    Procedure(s) Performed: Procedure(s) (LRB):  COLONOSCOPY (N/A)    Patient location: PACU    Anesthesia Type: MAC    Transport from OR: Transported from OR on room air with adequate spontaneous ventilation    Post pain: adequate analgesia    Post assessment: no apparent anesthetic complications and tolerated procedure well    Post vital signs: stable    Level of consciousness: awake, alert and oriented    Nausea/Vomiting: no nausea/vomiting    Complications: none    Transfer of care protocol was followed      Last vitals:   Visit Vitals  /89 (BP Location: Left arm, Patient Position: Lying)   Pulse 78   Temp 36.8 °C (98.2 °F) (Temporal)   Resp 18   Ht 5' 10" (1.778 m)   Wt (!) 137.8 kg (303 lb 12.7 oz)   BMI 43.59 kg/m²     "

## 2020-01-20 NOTE — ANESTHESIA PREPROCEDURE EVALUATION
01/20/2020  Yoan Thomason is a 49 y.o., male.    Anesthesia Evaluation    I have reviewed the Patient Summary Reports.    I have reviewed the Nursing Notes.   I have reviewed the Medications.     Review of Systems  Anesthesia Hx:  No problems with previous Anesthesia  Denies Family Hx of Anesthesia complications.   Denies Personal Hx of Anesthesia complications.   Social:  Non-Smoker, No Alcohol Use    Hematology/Oncology:  Hematology Normal   Oncology Normal     Cardiovascular:   Hypertension Denies MI.   Denies CABG/stent.         Pulmonary:   Denies COPD.  Denies Asthma.  Denies Sleep Apnea.    Renal/:  Renal/ Normal     Hepatic/GI:   Bowel Prep. Denies GERD. Denies Liver Disease.  Denies Hepatitis.    Musculoskeletal:  Musculoskeletal Normal    Neurological:   Denies CVA. Denies Seizures.    Endocrine:  Endocrine Normal        Physical Exam  General:  Morbid Obesity    Airway/Jaw/Neck:  Airway Findings: Mouth Opening: Normal Tongue: Normal  General Airway Assessment: Adult  Mallampati: II      Dental:  Dental Findings: In tact   Chest/Lungs:  Chest/Lungs Findings: Clear to auscultation, Normal Respiratory Rate     Heart/Vascular:  Heart Findings: Rate: Normal  Rhythm: Regular Rhythm             Anesthesia Plan  Type of Anesthesia, risks & benefits discussed:  Anesthesia Type:  MAC  Patient's Preference:   Intra-op Monitoring Plan: standard ASA monitors  Intra-op Monitoring Plan Comments:   Post Op Pain Control Plan:   Post Op Pain Control Plan Comments:   Induction:   IV  Beta Blocker:  Patient is not currently on a Beta-Blocker (No further documentation required).       Informed Consent: Patient understands risks and agrees with Anesthesia plan.  Questions answered. Anesthesia consent signed with patient.  ASA Score: 2     Day of Surgery Review of History & Physical: I have interviewed and  examined the patient. I have reviewed the patient's H&P dated:  There are no significant changes.  H&P update referred to the surgeon.         Ready For Surgery From Anesthesia Perspective.

## 2020-01-20 NOTE — [employer] PATIENT INSTRUCTIONS
Discharge Summary/Instructions after an Endoscopic Procedure  Patient Name: Yoan Thomason  Patient MRN: [id number]  Patient YOB: 1970 Monday, January 20, 2020 Joellen West MD  RESTRICTIONS:  During your procedure today, you received medications for sedation.  These   medications may affect your judgment, balance and coordination.  Therefore,   for 24 hours, you have the following restrictions:   - DO NOT drive a car, operate machinery, make legal/financial decisions,   sign important papers or drink alcohol.    ACTIVITY:  Today: no heavy lifting, straining or running due to procedural   sedation/anesthesia.  The following day: return to full activity including work.  DIET:  Eat and drink normally unless instructed otherwise.     TREATMENT FOR COMMON SIDE EFFECTS:  - Mild abdominal pain, nausea, belching, bloating or excessive gas:  rest,   eat lightly and use a heating pad.  - Sore Throat: treat with throat lozenges and/or gargle with warm salt   water.  - Because air was used during the procedure, expelling large amounts of air   from your rectum or belching is normal.  - If a bowel prep was taken, you may not have a bowel movement for 1-3 days.    This is normal.  SYMPTOMS TO WATCH FOR AND REPORT TO YOUR PHYSICIAN:  1. Abdominal pain or bloating, other than gas cramps.  2. Chest pain.  3. Back pain.  4. Signs of infection such as: chills or fever occurring within 24 hours   after the procedure.  5. Rectal bleeding, which would show as bright red, maroon, or black stools.   (A tablespoon of blood from the rectum is not serious, especially if   hemorrhoids are present.)  6. Vomiting.  7. Weakness or dizziness.  GO DIRECTLY TO THE NEAREST EMERGENCY ROOM IF YOU HAVE ANY OF THE FOLLOWING:      Difficulty breathing              Chills and/or fever over 101 F   Persistent vomiting and/or vomiting blood   Severe abdominal pain   Severe chest pain   Black, tarry stools   Bleeding- more than one  tablespoon   Any other symptom or condition that you feel may need urgent attention  Your doctor recommends these additional instructions:  If any biopsies were taken, your doctors clinic will contact you in 1 to 2   weeks with any results.  - Patient has a contact number available for emergencies.  The signs and   symptoms of potential delayed complications were discussed with the   patient.  Return to normal activities tomorrow.  Written discharge   instructions were provided to the patient.   - Resume previous diet.   - Continue present medications.   - Await pathology results.   - Repeat colonoscopy in 5 years for surveillance based on pathology results.     - Return to primary care physician as previously scheduled.   - Discharge patient to home (ambulatory).  For questions, problems or results please call your physician Joellen West MD at Work:  [phone]  If you have any questions about the above instructions, call the GI   department at [phone] or call the endoscopy unit at [phone]   from 7am until 3 pm.  [facility] [place] [facility], EMERGENCY ROOM PHONE NUMBER:   [phone]  IF A COMPLICATION OR EMERGENCY SITUATION ARISES AND YOU ARE UNABLE TO REACH   YOUR PHYSICIAN - GO DIRECTLY TO THE EMERGENCY ROOM.  I have read or have had read to me these discharge instructions for my   procedure and have received a written copy.  I understand these   instructions and will follow-up with my physician if I have any questions.     __________________________________       _____________________________________  Nurse Signature                                          Patient/Designated   Responsible Party Signature  Joellen West MD  1/20/2020 10:09:37 AM  This report has been verified and signed electronically.  [employer]

## 2020-01-23 LAB
FINAL PATHOLOGIC DIAGNOSIS: NORMAL
GROSS: NORMAL

## 2020-01-24 ENCOUNTER — TELEPHONE (OUTPATIENT)
Dept: SURGERY | Facility: CLINIC | Age: 50
End: 2020-01-24

## 2020-01-24 NOTE — TELEPHONE ENCOUNTER
Called patient with benign results from his colonoscopy. Repeat in 5 years. Understanding verbalized.

## 2020-01-29 ENCOUNTER — PATIENT OUTREACH (OUTPATIENT)
Dept: OTHER | Facility: OTHER | Age: 50
End: 2020-01-29

## 2020-02-10 ENCOUNTER — PATIENT OUTREACH (OUTPATIENT)
Dept: OTHER | Facility: OTHER | Age: 50
End: 2020-02-10

## 2020-02-10 DIAGNOSIS — I10 ESSENTIAL HYPERTENSION: ICD-10-CM

## 2020-02-10 RX ORDER — IRBESARTAN 300 MG/1
300 TABLET ORAL NIGHTLY
Qty: 90 TABLET | Refills: 2 | Status: SHIPPED | OUTPATIENT
Start: 2020-02-10 | End: 2021-01-28 | Stop reason: SDUPTHER

## 2020-02-14 ENCOUNTER — PATIENT MESSAGE (OUTPATIENT)
Dept: FAMILY MEDICINE | Facility: CLINIC | Age: 50
End: 2020-02-14

## 2020-02-14 RX ORDER — CODEINE PHOSPHATE AND GUAIFENESIN 10; 100 MG/5ML; MG/5ML
SOLUTION ORAL
Qty: 120 ML | Refills: 0 | OUTPATIENT
Start: 2020-02-14

## 2020-03-20 NOTE — PROGRESS NOTES
Digital Medicine: Health  Follow-Up    Patient reports that he is doing pretty well. Patient avg BP is down from 144/95 (1/3/20) to 121/90 (3/20/20). While readings have looked better BP is trending up. Patient remains active and has made some improvements to his diet. Encouraged patient to embrace these changes and to continue to work to bring BP down. Will f/u in 4 weeks.    The history is provided by the patient. No  was used.     Follow Up  Follow-up reason(s): reading review and routine education      Readings are trending up due to lifestyle change and inaccurate technique.    Routine Education Topics: eating patterns and physical activity        INTERVENTION(S)  recommend physical activity, reviewed monitoring technique and encouragement/support      There are no preventive care reminders to display for this patient.    Last 5 Patient Entered Readings                                      Current 30 Day Average: 121/90     Recent Readings 3/16/2020 3/16/2020 3/9/2020 2/27/2020 2/16/2020    SBP (mmHg) 133 132 110 120 134    DBP (mmHg) 91 103 78 89 80    Pulse 74 75 85 84 90                      Diet Screening       Barriers to a Healthy Diet: no barriers to healthy eating    Patient reports since the coronavirus he and his family have been coking a lot more at home and he attributes a decrease in DBP to this. Encouraged patient to use this time to build momentum into changing his diet.     Physical Activity Screening   When asked if exercising, patient responded: yes    Patient participates in the following activities: walking and yard/housework    He identified the following barriers to physical activity: coronavirus shutting down gyms    Patient had been working out with his daughter a few times a week but since the coronavirus patient has had to resort to walking more. Encouraged patient to try and be as active as possible.    Medication Adherence Screening   He did not miss a dose  this month.    Patient identified the following reasons for non-compliance: None    Patient reports no s/s or issues taking BP medication as prescribed.       SDOH

## 2020-05-26 ENCOUNTER — PATIENT MESSAGE (OUTPATIENT)
Dept: ADMINISTRATIVE | Facility: OTHER | Age: 50
End: 2020-05-26

## 2020-05-26 ENCOUNTER — PATIENT MESSAGE (OUTPATIENT)
Dept: OTHER | Facility: OTHER | Age: 50
End: 2020-05-26

## 2020-06-08 ENCOUNTER — PATIENT OUTREACH (OUTPATIENT)
Dept: OTHER | Facility: OTHER | Age: 50
End: 2020-06-08

## 2020-06-19 ENCOUNTER — LAB VISIT (OUTPATIENT)
Dept: PRIMARY CARE CLINIC | Facility: OTHER | Age: 50
End: 2020-06-19
Payer: COMMERCIAL

## 2020-06-19 DIAGNOSIS — Z03.818 ENCOUNTER FOR OBSERVATION FOR SUSPECTED EXPOSURE TO OTHER BIOLOGICAL AGENTS RULED OUT: ICD-10-CM

## 2020-06-19 PROCEDURE — U0003 INFECTIOUS AGENT DETECTION BY NUCLEIC ACID (DNA OR RNA); SEVERE ACUTE RESPIRATORY SYNDROME CORONAVIRUS 2 (SARS-COV-2) (CORONAVIRUS DISEASE [COVID-19]), AMPLIFIED PROBE TECHNIQUE, MAKING USE OF HIGH THROUGHPUT TECHNOLOGIES AS DESCRIBED BY CMS-2020-01-R: HCPCS

## 2020-06-21 LAB — SARS-COV-2 RNA RESP QL NAA+PROBE: NOT DETECTED

## 2020-08-25 NOTE — PROGRESS NOTES
Digital Medicine: Clinician Follow-Up    Called patient for digital medicine follow up to discuss upward trend in readings. Mr. Thomason states that he is doing well. Blood pressure has been elevate up until last reading on 8/24. Patient states that he has not been consistent with medications due to busy work schedule and travelling.     The history is provided by the patient.      Review of patient's allergies indicates:  No Known Allergies  Follow-up reason(s): routine follow up.     Hypertension    Readings are trending up   Patient is not experiencing signs/symptoms of hypotension.  Patient is not experiencing signs/symptoms of hypertension.          Last 5 Patient Entered Readings    Current 30 Day Average: 138/98     Recent Readings 8/24/2020 8/4/2020 8/3/2020 8/3/2020 8/3/2020    SBP (mmHg) 121 140 152 160 156    DBP (mmHg) 83 85 99 113 108    Pulse 79 84 78 79 73               Depression Screening  Did not address depression screening.    Sleep Apnea Screening    Did not address sleep apnea screening.     Medication Affordability Screening  Did not address medication affordability screening.     Medication Adherence-Medication adherence was asssessed.    Patient reported missing medication: more than once a week.    Patient identified the following reasons for non-adherence:  Schedule concerns.  Adherence tools used: None      ASSESSMENT(S)  Patients BP average is 129/91 mmHg, which is above goal. Patient's BP goal is less than or equal to 130/80 per 2017 ACC/AHA Hypertension Guidelines.       Hypertension Plan  Continue current therapy. Irbesartan 300 mg, amlodipine 10 mg and chlorthalidone 25 mg.  Recommended adherence tool. Recommended use if an alarm however, patient declined.  He will begin taking medications with morning protein shake instead.  Provided patient education. Discussed the importance of taking medications daily for optimal blood pressure control.        Addressed any questions or concerns  and patient has my contact information if needed prior to next outreach. Patient verbalizes understanding.            There are no preventive care reminders to display for this patient.      Hypertension Medications             amLODIPine (NORVASC) 10 MG tablet TAKE 1 TABLET BY MOUTH EVERY DAY    chlorthalidone (HYGROTEN) 25 MG Tab TAKE ONE-HALF TABLET (12.5 MG) BY MOUTH ONCE DAILY. MAY INCREASE TO ONE FULL TABLET (25 MG) IN TWO WEEKS.    irbesartan (AVAPRO) 300 MG tablet TAKE 1 TABLET (300 MG TOTAL) BY MOUTH EVERY EVENING.

## 2020-08-27 NOTE — PROGRESS NOTES
Digital Medicine: Health  Follow-Up    The history is provided by the patient.             Reason for review: Blood pressure not at goal        Topics Covered on Call: physical activity and Diet    Additional Follow-up details: Patient reports that he is doing pretty well. 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 Patient also reports that he wasn't taking his BP medication and his diet had come off the rails. Patient reports taking BP medication again and starting new diet. Patient had to cut call short because of an incoming phone call. Patient stated he was working on his situation and that he feels a lot better. Will f/u in 4 weeks.         Diet-Change  No 24 hour dietary recall  Patient reports eating or drinking the following: Patient reports that he started on Keto 3 weeks ago and has lost 20 lbs so far. Patient has basically cut out carbs completely. Patient reports following a 70/25/5 rules (70% fat, 25% protein, 5% carbs). Patient reports cutting out bread, potatoes rice, soft drinks, etc. Patient reports that he still has to eat take out and fast food and so finding menu items that work for him is hard. Patient does carry low carb tortillas and will order burgers or something similar without the bread and put it on the tortilla. Patient had to take a call before we could finish conversation.      Physical Activity-Change      Additional physical activity details: Patient reports that he is being less active than he was before. Patient reports that he has been getting out with his girls to ride the 4 weber. Patient knows that its not much but he is getting out and doing something. Encouraged patient to return to activity when he can. Patient reports that last time he went walking he left his phone at home and had about 20 messages. My thoughts are that patient could walk and talk if need be  but didn't get to finish conversation because patient had to take a phone call.       Medication Adherence-Medication Adherence not addressed.      Substance, Sleep, Stress-Not assessed    Plan  There are no preventive care reminders to display for this patient.    Last 5 Patient Entered Readings                                      Current 30 Day Average: 133/97     Recent Readings 8/27/2020 8/24/2020 8/4/2020 8/3/2020 8/3/2020    SBP (mmHg) 118 121 140 152 160    DBP (mmHg) 91 83 85 99 113    Pulse 78 79 84 78 79

## 2020-10-06 NOTE — TELEPHONE ENCOUNTER
Patient informed OV needed for further refills after this one   Will send pt to main ED for further evaluation.

## 2020-10-19 ENCOUNTER — PATIENT OUTREACH (OUTPATIENT)
Dept: OTHER | Facility: OTHER | Age: 50
End: 2020-10-19

## 2020-10-21 ENCOUNTER — PATIENT MESSAGE (OUTPATIENT)
Dept: ADMINISTRATIVE | Facility: OTHER | Age: 50
End: 2020-10-21

## 2020-11-04 ENCOUNTER — PATIENT OUTREACH (OUTPATIENT)
Dept: ADMINISTRATIVE | Facility: HOSPITAL | Age: 50
End: 2020-11-04

## 2020-11-04 ENCOUNTER — PATIENT MESSAGE (OUTPATIENT)
Dept: FAMILY MEDICINE | Facility: CLINIC | Age: 50
End: 2020-11-04

## 2020-11-04 ENCOUNTER — PATIENT MESSAGE (OUTPATIENT)
Dept: ADMINISTRATIVE | Facility: HOSPITAL | Age: 50
End: 2020-11-04

## 2020-11-04 DIAGNOSIS — I10 ESSENTIAL HYPERTENSION: Primary | ICD-10-CM

## 2020-11-04 NOTE — PROGRESS NOTES
Working HTN Report       Called pt for home BP Reading, No answer, L/M, Portal Message Sent       Janee SENIOR LPN Care Coordinator  Care Coordination Department  [facility]  [phone]

## 2020-12-08 NOTE — PROGRESS NOTES
Digital Medicine: Health  Follow-Up    The history is provided by the patient.                 Patient needs assistance troubleshooting: tech support needed.      Topics Covered on Call: physical activity, Diet and BP cuff not transmitting readings. will place CRM and send patient BP cuff connection guide    Additional Follow-up details: Patient returned call to let me know that he is doing well. Patient had close to within range BP readings with last readings in November. Patient reports that he has been following a keto diet and it has helped him to lose 36 lbs. Congratulated patient and encouraged him to keep up the good work. Patient reports that he has still not increased activity levels since they are on lockdown in regards to COVID. Will send BP connection guide to patient portal. Will f/u in 4-6 weeks to check in.            Diet-no change to diet  No 24 hour dietary recall  No change to diet.  Patient reports eating or drinking the following: Patient reports following a 70/25/5 rule (70% fat, 25% protein, 5% carbs) for his diet. and Patient reports that he continues to follow the keto diet which has helped him to lose about 36 lbs so far. Patient reports that he is losing about 1-2 lbs per week, which is a safe rate. Encouraged patient to keep up the good work.       Physical Activity-no change to routine  No change to exercise routine.       Additional physical activity details: Patient reports that activity is still limited because they are on lockdown. Encouraged patient to look for ways to be active around the house or his yard. Patient understood.       Medication Adherence-Medication adherence was assessed.      Substance, Sleep, Stress-Not assessed      Continue current diet/physical activity routine.  Tech support needed. Placed CRM  Provided patient education. BP connection guide would not attach to Vatler message, will send when available       Addressed patient questions and patient has my  contact information if needed prior to next outreach. Patient verbalizes understanding.      Explained the importance of self-monitoring and medication adherence. Encouraged the patient to communicate with their health  for lifestyle modifications to help improve or maintain a healthy lifestyle.               There are no preventive care reminders to display for this patient.      Last 5 Patient Entered Readings                                      Current 30 Day Average:      Recent Readings 11/7/2020 11/7/2020 11/4/2020 11/4/2020 10/25/2020    SBP (mmHg) 120 120 133 133 131    DBP (mmHg) 87 87 81 81 84    Pulse - 76 - 75 75

## 2020-12-17 ENCOUNTER — PATIENT MESSAGE (OUTPATIENT)
Dept: ADMINISTRATIVE | Facility: OTHER | Age: 50
End: 2020-12-17

## 2021-01-14 DIAGNOSIS — I10 ESSENTIAL HYPERTENSION: ICD-10-CM

## 2021-01-14 RX ORDER — CHLORTHALIDONE 25 MG/1
TABLET ORAL
Qty: 90 TABLET | Refills: 0 | Status: SHIPPED | OUTPATIENT
Start: 2021-01-14 | End: 2021-04-23

## 2021-01-14 RX ORDER — AMLODIPINE BESYLATE 10 MG/1
TABLET ORAL
Qty: 90 TABLET | Refills: 0 | Status: SHIPPED | OUTPATIENT
Start: 2021-01-14 | End: 2021-01-28 | Stop reason: SDUPTHER

## 2021-01-25 ENCOUNTER — PATIENT OUTREACH (OUTPATIENT)
Dept: ADMINISTRATIVE | Facility: HOSPITAL | Age: 51
End: 2021-01-25

## 2021-01-28 ENCOUNTER — OFFICE VISIT (OUTPATIENT)
Dept: FAMILY MEDICINE | Facility: CLINIC | Age: 51
End: 2021-01-28
Payer: COMMERCIAL

## 2021-01-28 VITALS
WEIGHT: 279.38 LBS | HEART RATE: 84 BPM | TEMPERATURE: 98 F | BODY MASS INDEX: 39.99 KG/M2 | SYSTOLIC BLOOD PRESSURE: 125 MMHG | DIASTOLIC BLOOD PRESSURE: 81 MMHG | HEIGHT: 70 IN

## 2021-01-28 DIAGNOSIS — Z00.00 ANNUAL PHYSICAL EXAM: Primary | ICD-10-CM

## 2021-01-28 DIAGNOSIS — E66.01 MORBID OBESITY: ICD-10-CM

## 2021-01-28 DIAGNOSIS — Z76.0 MEDICATION REFILL: ICD-10-CM

## 2021-01-28 DIAGNOSIS — Z12.5 PROSTATE CANCER SCREENING: ICD-10-CM

## 2021-01-28 DIAGNOSIS — I10 ESSENTIAL HYPERTENSION: ICD-10-CM

## 2021-01-28 PROCEDURE — 3008F BODY MASS INDEX DOCD: CPT | Mod: CPTII,S$GLB,, | Performed by: NURSE PRACTITIONER

## 2021-01-28 PROCEDURE — 3074F PR MOST RECENT SYSTOLIC BLOOD PRESSURE < 130 MM HG: ICD-10-PCS | Mod: CPTII,S$GLB,, | Performed by: NURSE PRACTITIONER

## 2021-01-28 PROCEDURE — 3079F DIAST BP 80-89 MM HG: CPT | Mod: CPTII,S$GLB,, | Performed by: NURSE PRACTITIONER

## 2021-01-28 PROCEDURE — 1126F AMNT PAIN NOTED NONE PRSNT: CPT | Mod: S$GLB,,, | Performed by: NURSE PRACTITIONER

## 2021-01-28 PROCEDURE — 1126F PR PAIN SEVERITY QUANTIFIED, NO PAIN PRESENT: ICD-10-PCS | Mod: S$GLB,,, | Performed by: NURSE PRACTITIONER

## 2021-01-28 PROCEDURE — 99999 PR PBB SHADOW E&M-EST. PATIENT-LVL IV: CPT | Mod: PBBFAC,,, | Performed by: NURSE PRACTITIONER

## 2021-01-28 PROCEDURE — 3074F SYST BP LT 130 MM HG: CPT | Mod: CPTII,S$GLB,, | Performed by: NURSE PRACTITIONER

## 2021-01-28 PROCEDURE — 99396 PR PREVENTIVE VISIT,EST,40-64: ICD-10-PCS | Mod: S$GLB,,, | Performed by: NURSE PRACTITIONER

## 2021-01-28 PROCEDURE — 3079F PR MOST RECENT DIASTOLIC BLOOD PRESSURE 80-89 MM HG: ICD-10-PCS | Mod: CPTII,S$GLB,, | Performed by: NURSE PRACTITIONER

## 2021-01-28 PROCEDURE — 99999 PR PBB SHADOW E&M-EST. PATIENT-LVL IV: ICD-10-PCS | Mod: PBBFAC,,, | Performed by: NURSE PRACTITIONER

## 2021-01-28 PROCEDURE — 99396 PREV VISIT EST AGE 40-64: CPT | Mod: S$GLB,,, | Performed by: NURSE PRACTITIONER

## 2021-01-28 PROCEDURE — 3008F PR BODY MASS INDEX (BMI) DOCUMENTED: ICD-10-PCS | Mod: CPTII,S$GLB,, | Performed by: NURSE PRACTITIONER

## 2021-01-28 RX ORDER — IRBESARTAN 300 MG/1
300 TABLET ORAL NIGHTLY
Qty: 90 TABLET | Refills: 3 | Status: SHIPPED | OUTPATIENT
Start: 2021-01-28 | End: 2021-10-25

## 2021-01-28 RX ORDER — MINERAL OIL
180 ENEMA (ML) RECTAL DAILY
COMMUNITY

## 2021-01-28 RX ORDER — AMLODIPINE BESYLATE 10 MG/1
10 TABLET ORAL DAILY
Qty: 90 TABLET | Refills: 3 | Status: SHIPPED | OUTPATIENT
Start: 2021-01-28 | End: 2022-01-31

## 2021-02-05 LAB
ALBUMIN SERPL-MCNC: 4.3 G/DL (ref 3.8–4.9)
ALBUMIN/GLOB SERPL: 1.5 {RATIO} (ref 1.2–2.2)
ALP SERPL-CCNC: 84 IU/L (ref 39–117)
ALT SERPL-CCNC: 13 IU/L (ref 0–44)
AST SERPL-CCNC: 20 IU/L (ref 0–40)
BASOPHILS # BLD AUTO: 0.1 X10E3/UL (ref 0–0.2)
BASOPHILS NFR BLD AUTO: 1 %
BILIRUB SERPL-MCNC: 0.6 MG/DL (ref 0–1.2)
BUN SERPL-MCNC: 20 MG/DL (ref 6–24)
BUN/CREAT SERPL: 21 (ref 9–20)
CALCIUM SERPL-MCNC: 9.8 MG/DL (ref 8.7–10.2)
CHLORIDE SERPL-SCNC: 96 MMOL/L (ref 96–106)
CHOLEST SERPL-MCNC: 154 MG/DL (ref 100–199)
CO2 SERPL-SCNC: 29 MMOL/L (ref 20–29)
CREAT SERPL-MCNC: 0.94 MG/DL (ref 0.76–1.27)
EOSINOPHIL # BLD AUTO: 0.2 X10E3/UL (ref 0–0.4)
EOSINOPHIL NFR BLD AUTO: 2 %
ERYTHROCYTE [DISTWIDTH] IN BLOOD BY AUTOMATED COUNT: 14.4 % (ref 11.6–15.4)
GLOBULIN SER CALC-MCNC: 2.9 G/DL (ref 1.5–4.5)
GLUCOSE SERPL-MCNC: 106 MG/DL (ref 65–99)
HCT VFR BLD AUTO: 46.7 % (ref 37.5–51)
HDLC SERPL-MCNC: 41 MG/DL
HGB BLD-MCNC: 14.4 G/DL (ref 13–17.7)
IMM GRANULOCYTES # BLD AUTO: 0 X10E3/UL (ref 0–0.1)
IMM GRANULOCYTES NFR BLD AUTO: 0 %
LDLC SERPL CALC-MCNC: 96 MG/DL (ref 0–99)
LYMPHOCYTES # BLD AUTO: 2.5 X10E3/UL (ref 0.7–3.1)
LYMPHOCYTES NFR BLD AUTO: 27 %
MCH RBC QN AUTO: 24.2 PG (ref 26.6–33)
MCHC RBC AUTO-ENTMCNC: 30.8 G/DL (ref 31.5–35.7)
MCV RBC AUTO: 79 FL (ref 79–97)
MONOCYTES # BLD AUTO: 0.5 X10E3/UL (ref 0.1–0.9)
MONOCYTES NFR BLD AUTO: 6 %
NEUTROPHILS # BLD AUTO: 6 X10E3/UL (ref 1.4–7)
NEUTROPHILS NFR BLD AUTO: 64 %
PLATELET # BLD AUTO: 201 X10E3/UL (ref 150–450)
POTASSIUM SERPL-SCNC: 3.7 MMOL/L (ref 3.5–5.2)
PROT SERPL-MCNC: 7.2 G/DL (ref 6–8.5)
PSA SERPL-MCNC: 0.5 NG/ML (ref 0–4)
RBC # BLD AUTO: 5.95 X10E6/UL (ref 4.14–5.8)
SODIUM SERPL-SCNC: 141 MMOL/L (ref 134–144)
TRIGL SERPL-MCNC: 93 MG/DL (ref 0–149)
TSH SERPL DL<=0.005 MIU/L-ACNC: 1.22 UIU/ML (ref 0.45–4.5)
VLDLC SERPL CALC-MCNC: 17 MG/DL (ref 5–40)
WBC # BLD AUTO: 9.3 X10E3/UL (ref 3.4–10.8)

## 2021-04-13 ENCOUNTER — PATIENT MESSAGE (OUTPATIENT)
Dept: ADMINISTRATIVE | Facility: OTHER | Age: 51
End: 2021-04-13

## 2021-04-29 ENCOUNTER — PATIENT MESSAGE (OUTPATIENT)
Dept: RESEARCH | Facility: HOSPITAL | Age: 51
End: 2021-04-29

## 2021-11-03 ENCOUNTER — PATIENT MESSAGE (OUTPATIENT)
Dept: ADMINISTRATIVE | Facility: OTHER | Age: 51
End: 2021-11-03
Payer: COMMERCIAL

## 2021-11-30 ENCOUNTER — OFFICE VISIT (OUTPATIENT)
Dept: FAMILY MEDICINE | Facility: CLINIC | Age: 51
End: 2021-11-30
Payer: COMMERCIAL

## 2021-11-30 VITALS
TEMPERATURE: 98 F | BODY MASS INDEX: 39.51 KG/M2 | WEIGHT: 276 LBS | HEIGHT: 70 IN | HEART RATE: 81 BPM | DIASTOLIC BLOOD PRESSURE: 88 MMHG | SYSTOLIC BLOOD PRESSURE: 136 MMHG

## 2021-11-30 DIAGNOSIS — R06.83 SNORING: Primary | ICD-10-CM

## 2021-11-30 DIAGNOSIS — E66.01 SEVERE OBESITY WITH BODY MASS INDEX (BMI) OF 35.0 TO 39.9 WITH COMORBIDITY: ICD-10-CM

## 2021-11-30 DIAGNOSIS — R06.81 WITNESSED EPISODE OF APNEA: ICD-10-CM

## 2021-11-30 DIAGNOSIS — I10 ESSENTIAL HYPERTENSION: ICD-10-CM

## 2021-11-30 PROCEDURE — 99999 PR PBB SHADOW E&M-EST. PATIENT-LVL III: ICD-10-PCS | Mod: PBBFAC,,, | Performed by: FAMILY MEDICINE

## 2021-11-30 PROCEDURE — 4010F PR ACE/ARB THEARPY RXD/TAKEN: ICD-10-PCS | Mod: CPTII,S$GLB,, | Performed by: FAMILY MEDICINE

## 2021-11-30 PROCEDURE — 4010F ACE/ARB THERAPY RXD/TAKEN: CPT | Mod: CPTII,S$GLB,, | Performed by: FAMILY MEDICINE

## 2021-11-30 PROCEDURE — 90750 HZV VACC RECOMBINANT IM: CPT | Mod: S$GLB,,, | Performed by: FAMILY MEDICINE

## 2021-11-30 PROCEDURE — 90686 FLU VACCINE (QUAD) GREATER THAN OR EQUAL TO 3YO PRESERVATIVE FREE IM: ICD-10-PCS | Mod: S$GLB,,, | Performed by: FAMILY MEDICINE

## 2021-11-30 PROCEDURE — 99999 PR PBB SHADOW E&M-EST. PATIENT-LVL III: CPT | Mod: PBBFAC,,, | Performed by: FAMILY MEDICINE

## 2021-11-30 PROCEDURE — 99213 OFFICE O/P EST LOW 20 MIN: CPT | Mod: 25,S$GLB,, | Performed by: FAMILY MEDICINE

## 2021-11-30 PROCEDURE — 99213 PR OFFICE/OUTPT VISIT, EST, LEVL III, 20-29 MIN: ICD-10-PCS | Mod: 25,S$GLB,, | Performed by: FAMILY MEDICINE

## 2021-11-30 PROCEDURE — 90750 ZOSTER RECOMBINANT VACCINE: ICD-10-PCS | Mod: S$GLB,,, | Performed by: FAMILY MEDICINE

## 2021-11-30 PROCEDURE — 90472 IMMUNIZATION ADMIN EACH ADD: CPT | Mod: S$GLB,,, | Performed by: FAMILY MEDICINE

## 2021-11-30 PROCEDURE — 90472 ZOSTER RECOMBINANT VACCINE: ICD-10-PCS | Mod: S$GLB,,, | Performed by: FAMILY MEDICINE

## 2021-11-30 PROCEDURE — 90471 FLU VACCINE (QUAD) GREATER THAN OR EQUAL TO 3YO PRESERVATIVE FREE IM: ICD-10-PCS | Mod: S$GLB,,, | Performed by: FAMILY MEDICINE

## 2021-11-30 PROCEDURE — 90471 IMMUNIZATION ADMIN: CPT | Mod: S$GLB,,, | Performed by: FAMILY MEDICINE

## 2021-11-30 PROCEDURE — 90686 IIV4 VACC NO PRSV 0.5 ML IM: CPT | Mod: S$GLB,,, | Performed by: FAMILY MEDICINE

## 2021-11-30 RX ORDER — IRBESARTAN 300 MG/1
300 TABLET ORAL NIGHTLY
COMMUNITY
End: 2022-01-31

## 2021-12-03 ENCOUNTER — PATIENT MESSAGE (OUTPATIENT)
Dept: FAMILY MEDICINE | Facility: CLINIC | Age: 51
End: 2021-12-03
Payer: COMMERCIAL

## 2021-12-03 ENCOUNTER — PATIENT MESSAGE (OUTPATIENT)
Dept: PULMONOLOGY | Facility: CLINIC | Age: 51
End: 2021-12-03
Payer: COMMERCIAL

## 2021-12-08 ENCOUNTER — OFFICE VISIT (OUTPATIENT)
Dept: PULMONOLOGY | Facility: CLINIC | Age: 51
End: 2021-12-08
Payer: COMMERCIAL

## 2021-12-08 ENCOUNTER — TELEPHONE (OUTPATIENT)
Dept: PULMONOLOGY | Facility: CLINIC | Age: 51
End: 2021-12-08
Payer: COMMERCIAL

## 2021-12-08 DIAGNOSIS — I10 ESSENTIAL HYPERTENSION: ICD-10-CM

## 2021-12-08 DIAGNOSIS — E66.01 MORBID OBESITY: ICD-10-CM

## 2021-12-08 DIAGNOSIS — R06.81 WITNESSED EPISODE OF APNEA: ICD-10-CM

## 2021-12-08 DIAGNOSIS — G47.33 OSA (OBSTRUCTIVE SLEEP APNEA): Primary | ICD-10-CM

## 2021-12-08 DIAGNOSIS — E66.01 SEVERE OBESITY WITH BODY MASS INDEX (BMI) OF 35.0 TO 39.9 WITH COMORBIDITY: ICD-10-CM

## 2021-12-08 DIAGNOSIS — R06.83 SNORING: ICD-10-CM

## 2021-12-08 PROCEDURE — 4010F ACE/ARB THERAPY RXD/TAKEN: CPT | Mod: CPTII,95,, | Performed by: INTERNAL MEDICINE

## 2021-12-08 PROCEDURE — 99203 PR OFFICE/OUTPT VISIT, NEW, LEVL III, 30-44 MIN: ICD-10-PCS | Mod: 95,,, | Performed by: INTERNAL MEDICINE

## 2021-12-08 PROCEDURE — 99203 OFFICE O/P NEW LOW 30 MIN: CPT | Mod: 95,,, | Performed by: INTERNAL MEDICINE

## 2021-12-08 PROCEDURE — 4010F PR ACE/ARB THEARPY RXD/TAKEN: ICD-10-PCS | Mod: CPTII,95,, | Performed by: INTERNAL MEDICINE

## 2021-12-14 ENCOUNTER — HOSPITAL ENCOUNTER (OUTPATIENT)
Dept: RADIOLOGY | Facility: HOSPITAL | Age: 51
Discharge: HOME | End: 2021-12-14
Attending: INTERNAL MEDICINE
Payer: COMMERCIAL

## 2021-12-14 DIAGNOSIS — G47.33 OSA (OBSTRUCTIVE SLEEP APNEA): ICD-10-CM

## 2021-12-14 PROCEDURE — 71046 XR CHEST PA AND LATERAL: ICD-10-PCS | Mod: 26,,, | Performed by: RADIOLOGY

## 2021-12-14 PROCEDURE — 71046 X-RAY EXAM CHEST 2 VIEWS: CPT | Mod: 26,,, | Performed by: RADIOLOGY

## 2021-12-14 PROCEDURE — 71046 X-RAY EXAM CHEST 2 VIEWS: CPT | Mod: TC,PO

## 2021-12-21 ENCOUNTER — PROCEDURE VISIT (OUTPATIENT)
Dept: SLEEP MEDICINE | Facility: CLINIC | Age: 51
End: 2021-12-21
Payer: COMMERCIAL

## 2021-12-21 DIAGNOSIS — G47.33 OSA (OBSTRUCTIVE SLEEP APNEA): ICD-10-CM

## 2021-12-26 ENCOUNTER — PATIENT MESSAGE (OUTPATIENT)
Dept: PULMONOLOGY | Facility: CLINIC | Age: 51
End: 2021-12-26
Payer: COMMERCIAL

## 2021-12-26 PROCEDURE — 95806 PR SLEEP STUDY, UNATTENDED, SIMUL RECORD HR/O2 SAT/RESP FLOW/RESP EFFT: ICD-10-PCS | Mod: 26,52,S$GLB, | Performed by: INTERNAL MEDICINE

## 2021-12-26 PROCEDURE — 95806 SLEEP STUDY UNATT&RESP EFFT: CPT | Mod: 26,52,S$GLB, | Performed by: INTERNAL MEDICINE

## 2021-12-27 ENCOUNTER — PATIENT MESSAGE (OUTPATIENT)
Dept: ADMINISTRATIVE | Facility: OTHER | Age: 51
End: 2021-12-27
Payer: COMMERCIAL

## 2021-12-30 ENCOUNTER — OFFICE VISIT (OUTPATIENT)
Dept: PULMONOLOGY | Facility: CLINIC | Age: 51
End: 2021-12-30
Payer: COMMERCIAL

## 2021-12-30 ENCOUNTER — TELEPHONE (OUTPATIENT)
Dept: PULMONOLOGY | Facility: CLINIC | Age: 51
End: 2021-12-30

## 2021-12-30 DIAGNOSIS — G47.33 OSA (OBSTRUCTIVE SLEEP APNEA): Primary | ICD-10-CM

## 2021-12-30 DIAGNOSIS — E66.01 MORBID OBESITY: ICD-10-CM

## 2021-12-30 DIAGNOSIS — I10 ESSENTIAL HYPERTENSION: ICD-10-CM

## 2021-12-30 PROCEDURE — 4010F ACE/ARB THERAPY RXD/TAKEN: CPT | Mod: CPTII,95,, | Performed by: INTERNAL MEDICINE

## 2021-12-30 PROCEDURE — 1159F MED LIST DOCD IN RCRD: CPT | Mod: CPTII,95,, | Performed by: INTERNAL MEDICINE

## 2021-12-30 PROCEDURE — 1160F RVW MEDS BY RX/DR IN RCRD: CPT | Mod: CPTII,95,, | Performed by: INTERNAL MEDICINE

## 2021-12-30 PROCEDURE — 99213 OFFICE O/P EST LOW 20 MIN: CPT | Mod: 95,,, | Performed by: INTERNAL MEDICINE

## 2021-12-30 PROCEDURE — 4010F PR ACE/ARB THEARPY RXD/TAKEN: ICD-10-PCS | Mod: CPTII,95,, | Performed by: INTERNAL MEDICINE

## 2021-12-30 PROCEDURE — 99213 PR OFFICE/OUTPT VISIT, EST, LEVL III, 20-29 MIN: ICD-10-PCS | Mod: 95,,, | Performed by: INTERNAL MEDICINE

## 2021-12-30 PROCEDURE — 1160F PR REVIEW ALL MEDS BY PRESCRIBER/CLIN PHARMACIST DOCUMENTED: ICD-10-PCS | Mod: CPTII,95,, | Performed by: INTERNAL MEDICINE

## 2021-12-30 PROCEDURE — 1159F PR MEDICATION LIST DOCUMENTED IN MEDICAL RECORD: ICD-10-PCS | Mod: CPTII,95,, | Performed by: INTERNAL MEDICINE

## 2022-02-07 ENCOUNTER — LAB VISIT (OUTPATIENT)
Dept: LAB | Facility: HOSPITAL | Age: 52
End: 2022-02-07
Attending: FAMILY MEDICINE
Payer: COMMERCIAL

## 2022-02-07 ENCOUNTER — OFFICE VISIT (OUTPATIENT)
Dept: FAMILY MEDICINE | Facility: CLINIC | Age: 52
End: 2022-02-07
Payer: COMMERCIAL

## 2022-02-07 VITALS
SYSTOLIC BLOOD PRESSURE: 129 MMHG | BODY MASS INDEX: 38.8 KG/M2 | HEART RATE: 82 BPM | HEIGHT: 71 IN | TEMPERATURE: 98 F | WEIGHT: 277.13 LBS | DIASTOLIC BLOOD PRESSURE: 88 MMHG

## 2022-02-07 DIAGNOSIS — Z12.5 SCREENING FOR PROSTATE CANCER: ICD-10-CM

## 2022-02-07 DIAGNOSIS — D12.6 TUBULAR ADENOMA OF COLON: ICD-10-CM

## 2022-02-07 DIAGNOSIS — E66.01 SEVERE OBESITY WITH BODY MASS INDEX (BMI) OF 35.0 TO 39.9 WITH COMORBIDITY: ICD-10-CM

## 2022-02-07 DIAGNOSIS — G47.33 OSA (OBSTRUCTIVE SLEEP APNEA): ICD-10-CM

## 2022-02-07 DIAGNOSIS — Z00.00 ROUTINE HISTORY AND PHYSICAL EXAMINATION OF ADULT: ICD-10-CM

## 2022-02-07 DIAGNOSIS — I10 ESSENTIAL HYPERTENSION: ICD-10-CM

## 2022-02-07 DIAGNOSIS — Z00.00 ROUTINE HISTORY AND PHYSICAL EXAMINATION OF ADULT: Primary | ICD-10-CM

## 2022-02-07 LAB
ALBUMIN SERPL BCP-MCNC: 4 G/DL (ref 3.5–5.2)
ALP SERPL-CCNC: 71 U/L (ref 55–135)
ALT SERPL W/O P-5'-P-CCNC: 11 U/L (ref 10–44)
ANION GAP SERPL CALC-SCNC: 10 MMOL/L (ref 8–16)
AST SERPL-CCNC: 17 U/L (ref 10–40)
BILIRUB SERPL-MCNC: 0.5 MG/DL (ref 0.1–1)
BUN SERPL-MCNC: 25 MG/DL (ref 6–20)
CALCIUM SERPL-MCNC: 9.8 MG/DL (ref 8.7–10.5)
CHLORIDE SERPL-SCNC: 101 MMOL/L (ref 95–110)
CHOLEST SERPL-MCNC: 167 MG/DL (ref 120–199)
CHOLEST/HDLC SERPL: 3.7 {RATIO} (ref 2–5)
CO2 SERPL-SCNC: 28 MMOL/L (ref 23–29)
COMPLEXED PSA SERPL-MCNC: 0.31 NG/ML (ref 0–4)
CREAT SERPL-MCNC: 0.8 MG/DL (ref 0.5–1.4)
EST. GFR  (AFRICAN AMERICAN): >60 ML/MIN/1.73 M^2
EST. GFR  (NON AFRICAN AMERICAN): >60 ML/MIN/1.73 M^2
GLUCOSE SERPL-MCNC: 106 MG/DL (ref 70–110)
HDLC SERPL-MCNC: 45 MG/DL (ref 40–75)
HDLC SERPL: 26.9 % (ref 20–50)
LDLC SERPL CALC-MCNC: 105.6 MG/DL (ref 63–159)
NONHDLC SERPL-MCNC: 122 MG/DL
POTASSIUM SERPL-SCNC: 4 MMOL/L (ref 3.5–5.1)
PROT SERPL-MCNC: 8 G/DL (ref 6–8.4)
SODIUM SERPL-SCNC: 139 MMOL/L (ref 136–145)
TRIGL SERPL-MCNC: 82 MG/DL (ref 30–150)

## 2022-02-07 PROCEDURE — 3079F PR MOST RECENT DIASTOLIC BLOOD PRESSURE 80-89 MM HG: ICD-10-PCS | Mod: CPTII,S$GLB,, | Performed by: FAMILY MEDICINE

## 2022-02-07 PROCEDURE — 1159F PR MEDICATION LIST DOCUMENTED IN MEDICAL RECORD: ICD-10-PCS | Mod: CPTII,S$GLB,, | Performed by: FAMILY MEDICINE

## 2022-02-07 PROCEDURE — 99396 PREV VISIT EST AGE 40-64: CPT | Mod: 25,S$GLB,, | Performed by: FAMILY MEDICINE

## 2022-02-07 PROCEDURE — 99396 PR PREVENTIVE VISIT,EST,40-64: ICD-10-PCS | Mod: 25,S$GLB,, | Performed by: FAMILY MEDICINE

## 2022-02-07 PROCEDURE — 90471 IMMUNIZATION ADMIN: CPT | Mod: S$GLB,,, | Performed by: FAMILY MEDICINE

## 2022-02-07 PROCEDURE — 99999 PR PBB SHADOW E&M-EST. PATIENT-LVL III: ICD-10-PCS | Mod: PBBFAC,,, | Performed by: FAMILY MEDICINE

## 2022-02-07 PROCEDURE — 3008F PR BODY MASS INDEX (BMI) DOCUMENTED: ICD-10-PCS | Mod: CPTII,S$GLB,, | Performed by: FAMILY MEDICINE

## 2022-02-07 PROCEDURE — 3008F BODY MASS INDEX DOCD: CPT | Mod: CPTII,S$GLB,, | Performed by: FAMILY MEDICINE

## 2022-02-07 PROCEDURE — 90471 ZOSTER RECOMBINANT VACCINE: ICD-10-PCS | Mod: S$GLB,,, | Performed by: FAMILY MEDICINE

## 2022-02-07 PROCEDURE — 4010F PR ACE/ARB THEARPY RXD/TAKEN: ICD-10-PCS | Mod: CPTII,S$GLB,, | Performed by: FAMILY MEDICINE

## 2022-02-07 PROCEDURE — 36415 COLL VENOUS BLD VENIPUNCTURE: CPT | Mod: PO | Performed by: FAMILY MEDICINE

## 2022-02-07 PROCEDURE — 80061 LIPID PANEL: CPT | Performed by: FAMILY MEDICINE

## 2022-02-07 PROCEDURE — 3079F DIAST BP 80-89 MM HG: CPT | Mod: CPTII,S$GLB,, | Performed by: FAMILY MEDICINE

## 2022-02-07 PROCEDURE — 99999 PR PBB SHADOW E&M-EST. PATIENT-LVL III: CPT | Mod: PBBFAC,,, | Performed by: FAMILY MEDICINE

## 2022-02-07 PROCEDURE — 1159F MED LIST DOCD IN RCRD: CPT | Mod: CPTII,S$GLB,, | Performed by: FAMILY MEDICINE

## 2022-02-07 PROCEDURE — 3074F PR MOST RECENT SYSTOLIC BLOOD PRESSURE < 130 MM HG: ICD-10-PCS | Mod: CPTII,S$GLB,, | Performed by: FAMILY MEDICINE

## 2022-02-07 PROCEDURE — 80053 COMPREHEN METABOLIC PANEL: CPT | Performed by: FAMILY MEDICINE

## 2022-02-07 PROCEDURE — 3074F SYST BP LT 130 MM HG: CPT | Mod: CPTII,S$GLB,, | Performed by: FAMILY MEDICINE

## 2022-02-07 PROCEDURE — 84153 ASSAY OF PSA TOTAL: CPT | Performed by: FAMILY MEDICINE

## 2022-02-07 PROCEDURE — 4010F ACE/ARB THERAPY RXD/TAKEN: CPT | Mod: CPTII,S$GLB,, | Performed by: FAMILY MEDICINE

## 2022-02-07 PROCEDURE — 90750 HZV VACC RECOMBINANT IM: CPT | Mod: S$GLB,,, | Performed by: FAMILY MEDICINE

## 2022-02-07 PROCEDURE — 90750 ZOSTER RECOMBINANT VACCINE: ICD-10-PCS | Mod: S$GLB,,, | Performed by: FAMILY MEDICINE

## 2022-02-07 RX ORDER — CHLORTHALIDONE 25 MG/1
TABLET ORAL
Qty: 30 TABLET | Refills: 11 | Status: SHIPPED | OUTPATIENT
Start: 2022-02-07 | End: 2023-02-27 | Stop reason: SDUPTHER

## 2022-02-07 RX ORDER — IRBESARTAN 300 MG/1
300 TABLET ORAL NIGHTLY
Qty: 30 TABLET | Refills: 11 | Status: SHIPPED | OUTPATIENT
Start: 2022-02-07 | End: 2023-02-27 | Stop reason: SDUPTHER

## 2022-02-07 RX ORDER — AMLODIPINE BESYLATE 10 MG/1
10 TABLET ORAL DAILY
Qty: 30 TABLET | Refills: 11 | Status: SHIPPED | OUTPATIENT
Start: 2022-02-07 | End: 2023-02-25

## 2022-02-07 NOTE — PROGRESS NOTES
Patient presents physical exam.  Hypertension controlled.  Sleep apnea pending starting CPAP.  Obesity reviewed, states has lost some weight recently.  Colonoscopy up-to-date with previous tubular adenoma.    Yoan was seen today for annual exam.    Diagnoses and all orders for this visit:    Routine history and physical examination of adult  -     Comprehensive Metabolic Panel; Future  -     Lipid Panel; Future  -     PSA, Screening; Future    Essential hypertension  -     Comprehensive Metabolic Panel; Future  -     Lipid Panel; Future  -     chlorthalidone (HYGROTEN) 25 MG Tab; TAKE 1/2 TABLET BY MOUTH EVERY DAY MAY INCREASE TO 1 TABLET IN 2 WEEKS  -     amLODIPine (NORVASC) 10 MG tablet; Take 1 tablet (10 mg total) by mouth once daily.    PARAMJIT (obstructive sleep apnea)    Severe obesity with body mass index (BMI) of 35.0 to 39.9 with comorbidity    Tubular adenoma of colon    Screening for prostate cancer  -     PSA, Screening; Future    Other orders  -     Zoster Recombinant Vaccine  -     irbesartan (AVAPRO) 300 MG tablet; Take 1 tablet (300 mg total) by mouth every evening.       Continue current medication.  Recommend COVID booster.    Anticipatory guidance: Don't smoke.  Healthy diet and regular exercise recommended.          Past Medical History:  Past Medical History:   Diagnosis Date    Hypertension     Morbid obesity 5/15/2017    Tubular adenoma of colon 01/20/2020     Past Surgical History:   Procedure Laterality Date    COLONOSCOPY N/A 1/20/2020    Procedure: COLONOSCOPY;  Surgeon: Joellen West MD;  [facility];  Service: Endoscopy;  Laterality: N/A;    sinus surgery x5 yrs       Review of patient's allergies indicates:  No Known Allergies  Current Outpatient Medications on File Prior to Visit   Medication Sig Dispense Refill    fexofenadine (ALLEGRA) 180 MG tablet Take 180 mg by mouth once daily.      multivitamin capsule Take 1 capsule by mouth once daily.      [DISCONTINUED]  "amLODIPine (NORVASC) 10 MG tablet TAKE 1 TABLET BY MOUTH EVERY DAY 90 tablet 0    [DISCONTINUED] chlorthalidone (HYGROTEN) 25 MG Tab TAKE 1/2 TABLET BY MOUTH EVERY DAY MAY INCREASE TO 1 TABLET IN 2 WEEKS 90 tablet 3    [DISCONTINUED] irbesartan (AVAPRO) 300 MG tablet TAKE 1 TABLET BY MOUTH EVERY EVENING 90 tablet 0    cyanocobalamin 2000 MCG tablet Take 2,000 mcg by mouth once daily.       No current facility-administered medications on file prior to visit.     Social History     Socioeconomic History    Marital status:    Tobacco Use    Smoking status: Never Smoker    Smokeless tobacco: Never Used   Substance and Sexual Activity    Alcohol use: No    Drug use: No     Family History   Problem Relation Age of Onset    Diabetes Mother     Hypertension Father              ROS:  GENERAL: No fever, chills,  or significant weight changes.  HEENT: No headache or hearing complaints.  No dysphagia  Eyes: No vision complaints  CHEST: Denies PERERA, cyanosis, wheezing, cough and sputum production.  CARDIOVASCULAR: Denies chest pain, PND, orthopnea or reduced exercise tolerance.  ABDOMEN: Appetite fine. Denies diarrhea, abdominal pain, hematemesis or blood in stool.  URINARY: No flank pain, dysuria or hematuria.  MUSCULOSKELETAL: No warmth swelling or tenderness of the joints  NEUROLOGIC: No focal weakness numbness or paresthesia  PSYCHIATRIC: Denies depression        Vitals:    02/07/22 0911   BP: 129/88   Pulse: 82   Temp: 98.1 °F (36.7 °C)   TempSrc: Oral   Weight: 125.7 kg (277 lb 1.6 oz)   Height: 5' 10.5" (1.791 m)     Wt Readings from Last 3 Encounters:   02/07/22 125.7 kg (277 lb 1.6 oz)   11/30/21 125.2 kg (276 lb)   01/28/21 126.7 kg (279 lb 6.4 oz)       OBJECTIVE:   APPEARANCE: Well nourished, well developed, in no acute distress.    HEAD: Normocephalic.  Atraumatic.  No sinus tenderness.  EYES:   Right eye: Pupil reactive.  Conjunctiva clear.    Left eye: Pupil reactive.  Conjunctiva clear.  EOMI.  "   EARS: TM's intact. Light reflex normal. No retraction or perforation.    NOSE:  clear.  MOUTH & THROAT:  No pharyngeal erythema or exudate. No lesions.  NECK: Supple. No bruits.  No JVD.  No cervical lymphadenopathy.  No thyromegaly.    CHEST: Breath sounds clear bilaterally.  Normal respiratory effort  CARDIOVASCULAR: Normal rate.  Regular rhythm.  No murmurs.  No rub.  No gallops.  ABDOMEN: Bowel sounds normal.  Soft.  No tenderness.  No organomegaly.  PERIPHERAL VASCULAR: No cyanosis.  No clubbing.  No edema.  NEUROLOGIC: No focal findings.  MENTAL STATUS: Alert.  Oriented x 3.

## 2022-05-10 ENCOUNTER — PATIENT MESSAGE (OUTPATIENT)
Dept: FAMILY MEDICINE | Facility: CLINIC | Age: 52
End: 2022-05-10
Payer: COMMERCIAL

## 2022-06-07 ENCOUNTER — PATIENT MESSAGE (OUTPATIENT)
Dept: FAMILY MEDICINE | Facility: CLINIC | Age: 52
End: 2022-06-07
Payer: COMMERCIAL

## 2022-06-07 ENCOUNTER — PATIENT MESSAGE (OUTPATIENT)
Dept: ADMINISTRATIVE | Facility: OTHER | Age: 52
End: 2022-06-07
Payer: COMMERCIAL

## 2022-06-07 ENCOUNTER — PATIENT MESSAGE (OUTPATIENT)
Dept: PULMONOLOGY | Facility: CLINIC | Age: 52
End: 2022-06-07
Payer: COMMERCIAL

## 2022-07-11 ENCOUNTER — PATIENT MESSAGE (OUTPATIENT)
Dept: ADMINISTRATIVE | Facility: OTHER | Age: 52
End: 2022-07-11
Payer: COMMERCIAL

## 2022-08-12 ENCOUNTER — PATIENT MESSAGE (OUTPATIENT)
Dept: FAMILY MEDICINE | Facility: CLINIC | Age: 52
End: 2022-08-12
Payer: COMMERCIAL

## 2022-09-16 ENCOUNTER — PATIENT MESSAGE (OUTPATIENT)
Dept: PULMONOLOGY | Facility: CLINIC | Age: 52
End: 2022-09-16
Payer: COMMERCIAL

## 2022-09-21 NOTE — PROGRESS NOTES
Pulmonary Outpatient   Visit     Subjective:       Patient ID: Yoan Thomason is a 52 y.o. male.    Chief Complaint: Sleep Apnea      Yoan Thomason is 52 y.o.   Here to review CPAP  Moderate PARAMJIT  Goals of CPAP discussed  Weight loss  Using CPA and benefits  Machine takes long   CPAP 8 cm , AHI was 1.0  Usage > 4 hrs was 76.7%  Recent travel        The following portions of the patient's history were reviewed and updated as appropriate: He  has a past medical history of Hypertension, Morbid obesity (5/15/2017), and Tubular adenoma of colon (01/20/2020).  He does not have any pertinent problems on file.  He  has a past surgical history that includes sinus surgery x5 yrs and Colonoscopy (N/A, 1/20/2020).  His family history includes Diabetes in his mother; Hypertension in his father.  He  reports that he has never smoked. He has never used smokeless tobacco. He reports that he does not drink alcohol and does not use drugs.  He has a current medication list which includes the following prescription(s): amlodipine, chlorthalidone, cyanocobalamin, fexofenadine, irbesartan, and multivitamin.  Current Outpatient Medications on File Prior to Visit   Medication Sig Dispense Refill    amLODIPine (NORVASC) 10 MG tablet Take 1 tablet (10 mg total) by mouth once daily. 30 tablet 11    chlorthalidone (HYGROTEN) 25 MG Tab TAKE 1/2 TABLET BY MOUTH EVERY DAY MAY INCREASE TO 1 TABLET IN 2 WEEKS 30 tablet 11    cyanocobalamin 2000 MCG tablet Take 2,000 mcg by mouth once daily.      fexofenadine (ALLEGRA) 180 MG tablet Take 180 mg by mouth once daily.      irbesartan (AVAPRO) 300 MG tablet Take 1 tablet (300 mg total) by mouth every evening. 30 tablet 11    multivitamin capsule Take 1 capsule by mouth once daily.       No current facility-administered medications on file prior to visit.     He has No Known Allergies..         Review of Systems   Constitutional:  Positive for  "fatigue.   Respiratory:  Positive for apnea.    All other systems reviewed and are negative.    Outpatient Encounter Medications as of 9/22/2022   Medication Sig Dispense Refill    amLODIPine (NORVASC) 10 MG tablet Take 1 tablet (10 mg total) by mouth once daily. 30 tablet 11    chlorthalidone (HYGROTEN) 25 MG Tab TAKE 1/2 TABLET BY MOUTH EVERY DAY MAY INCREASE TO 1 TABLET IN 2 WEEKS 30 tablet 11    cyanocobalamin 2000 MCG tablet Take 2,000 mcg by mouth once daily.      fexofenadine (ALLEGRA) 180 MG tablet Take 180 mg by mouth once daily.      irbesartan (AVAPRO) 300 MG tablet Take 1 tablet (300 mg total) by mouth every evening. 30 tablet 11    multivitamin capsule Take 1 capsule by mouth once daily.       No facility-administered encounter medications on file as of 9/22/2022.       Objective:     Vital Signs (Most Recent)  Vital Signs  Pulse: 85  Resp: 18  SpO2: 98 %  BP: 122/80  Height and Weight  Height: 5' 10.5" (179.1 cm)  Weight: 130.9 kg (288 lb 9.3 oz)  BSA (Calculated - sq m): 2.55 sq meters  BMI (Calculated): 40.8  Weight in (lb) to have BMI = 25: 176.4]  Wt Readings from Last 2 Encounters:   09/22/22 130.9 kg (288 lb 9.3 oz)   02/07/22 125.7 kg (277 lb 1.6 oz)       Physical Exam   Constitutional: He appears well-developed and well-nourished.   Nursing note and vitals reviewed.    Laboratory  Lab Results   Component Value Date    WBC 9.3 02/04/2021    RBC 5.95 (H) 02/04/2021    HGB 14.4 02/04/2021    HCT 46.7 02/04/2021    MCV 79 02/04/2021    MCH 24.2 (L) 02/04/2021    MCHC 30.8 (L) 02/04/2021    RDW 14.4 02/04/2021     02/04/2021    MPV Not available (A) 05/26/2009    GRAN 4.5 05/26/2009    GRAN 57.8 05/26/2009    LYMPH 2.5 02/04/2021    MONO 6 02/04/2021    MONO 0.5 02/04/2021    EOS 0.2 02/04/2021    BASO 0.1 02/04/2021    EOSINOPHIL 2 02/04/2021    BASOPHIL 1 02/04/2021       BMP  Lab Results   Component Value Date     02/07/2022    K 4.0 02/07/2022     02/07/2022    CO2 28 " 02/07/2022    BUN 25 (H) 02/07/2022    CREATININE 0.8 02/07/2022    CALCIUM 9.8 02/07/2022    ANIONGAP 10 02/07/2022    ESTGFRAFRICA >60.0 02/07/2022    EGFRNONAA >60.0 02/07/2022    AST 17 02/07/2022    ALT 11 02/07/2022    PROT 8.0 02/07/2022       No results found for: BNP    Lab Results   Component Value Date    TSH 1.220 02/04/2021       No results found for: SEDRATE    No results found for: CRP  No results found for: IGE     No results found for: ASPERGILLUS  No results found for: AFUMIGATUSCL     No results found for: ACE     Diagnostic Results:  I have personally reviewed today the following studies:  1 night study  MODERATE OBSTRUCTIVE SLEEP APNEA with overall AHI 25.1/hr ( 99 events)  Oxygen desaturation: 85%. SpO2 between 85% to 89% for 10 min.  Patient snored 99 % time above 50 .  Heart rate range: 48bpm -104 bpm  REC's:  Initiate APAP at 5 -20 cm WP using mask of choice with heated humidification. INLAB CPAP titration may  also be option.  Weight loss/management. with regular exercise per direction of physician.  Avoid drowsy driving.  Follow up in sleep clinic to maximize adherence and ensure resolution of symptoms    Assessment/Plan:     Problem List Items Addressed This Visit       Essential hypertension     Stable on Norvasc, Hygroten, Avapro         Severe obesity with body mass index (BMI) of 35.0 to 39.9 with comorbidity     General weight loss/lifestyle modification strategies discussed (elicit support from others; identify saboteurs; non-food rewards).  Diet interventions: low calorie (1000 kCal/d) deficit diet           PARAMJIT on CPAP - Primary     [place] 3  Bed time 11pm  Wake time 8am  Using device  Last 90 days usage was 86.7%  > 4 hrs was 76.7%  benefits         Relevant Orders    CPAP/BIPAP SUPPLIES       Discussed therapeutic goals for positive airway pressure therapy(CPAP or BiPAP):   Ideal is usage 100% of nights for 6 - 8 hours per night. Minimum usage is 70% of night for at least 4  hours per night used. Pateint expressed understanding. All Questions answered.    Treatment Options for Sleep Disordered Breathing discussed:     [x]    Continuous Positive Airway Pressure (CPAP).  []    Surgical options  []    Oral appliances   []    Behavioral approaches.   []    Weight loss.   []    Avoiding alcohol and sedative medication.  []    Treat other underlying medical conditions eg. nasal allergies  []     INSPIRE  []     Provent/Theravent      Follow up in about 1 year (around 9/22/2023), or download, weight loss, see Stalin or Annia about billing.    This note was prepared using voice recognition system and is likely to have sound alike errors that may have been overlooked even after proof reading.  Please call me with any questions    Discussed diagnosis, its evaluation, treatment and usual course. All questions answered.      Trevor Silva MD

## 2022-09-22 ENCOUNTER — OFFICE VISIT (OUTPATIENT)
Dept: SLEEP MEDICINE | Facility: CLINIC | Age: 52
End: 2022-09-22
Payer: COMMERCIAL

## 2022-09-22 VITALS
DIASTOLIC BLOOD PRESSURE: 80 MMHG | BODY MASS INDEX: 40.4 KG/M2 | RESPIRATION RATE: 18 BRPM | HEIGHT: 71 IN | OXYGEN SATURATION: 98 % | HEART RATE: 85 BPM | WEIGHT: 288.56 LBS | SYSTOLIC BLOOD PRESSURE: 122 MMHG

## 2022-09-22 DIAGNOSIS — I10 ESSENTIAL HYPERTENSION: ICD-10-CM

## 2022-09-22 DIAGNOSIS — E66.01 SEVERE OBESITY WITH BODY MASS INDEX (BMI) OF 35.0 TO 39.9 WITH COMORBIDITY: ICD-10-CM

## 2022-09-22 DIAGNOSIS — G47.33 OSA ON CPAP: Primary | ICD-10-CM

## 2022-09-22 PROCEDURE — 3074F PR MOST RECENT SYSTOLIC BLOOD PRESSURE < 130 MM HG: ICD-10-PCS | Mod: CPTII,S$GLB,, | Performed by: INTERNAL MEDICINE

## 2022-09-22 PROCEDURE — 99214 PR OFFICE/OUTPT VISIT, EST, LEVL IV, 30-39 MIN: ICD-10-PCS | Mod: S$GLB,,, | Performed by: INTERNAL MEDICINE

## 2022-09-22 PROCEDURE — 3079F DIAST BP 80-89 MM HG: CPT | Mod: CPTII,S$GLB,, | Performed by: INTERNAL MEDICINE

## 2022-09-22 PROCEDURE — 3074F SYST BP LT 130 MM HG: CPT | Mod: CPTII,S$GLB,, | Performed by: INTERNAL MEDICINE

## 2022-09-22 PROCEDURE — 1159F PR MEDICATION LIST DOCUMENTED IN MEDICAL RECORD: ICD-10-PCS | Mod: CPTII,S$GLB,, | Performed by: INTERNAL MEDICINE

## 2022-09-22 PROCEDURE — 99999 PR PBB SHADOW E&M-EST. PATIENT-LVL IV: CPT | Mod: PBBFAC,,, | Performed by: INTERNAL MEDICINE

## 2022-09-22 PROCEDURE — 3008F BODY MASS INDEX DOCD: CPT | Mod: CPTII,S$GLB,, | Performed by: INTERNAL MEDICINE

## 2022-09-22 PROCEDURE — 3008F PR BODY MASS INDEX (BMI) DOCUMENTED: ICD-10-PCS | Mod: CPTII,S$GLB,, | Performed by: INTERNAL MEDICINE

## 2022-09-22 PROCEDURE — 1160F PR REVIEW ALL MEDS BY PRESCRIBER/CLIN PHARMACIST DOCUMENTED: ICD-10-PCS | Mod: CPTII,S$GLB,, | Performed by: INTERNAL MEDICINE

## 2022-09-22 PROCEDURE — 4010F PR ACE/ARB THEARPY RXD/TAKEN: ICD-10-PCS | Mod: CPTII,S$GLB,, | Performed by: INTERNAL MEDICINE

## 2022-09-22 PROCEDURE — 1159F MED LIST DOCD IN RCRD: CPT | Mod: CPTII,S$GLB,, | Performed by: INTERNAL MEDICINE

## 2022-09-22 PROCEDURE — 4010F ACE/ARB THERAPY RXD/TAKEN: CPT | Mod: CPTII,S$GLB,, | Performed by: INTERNAL MEDICINE

## 2022-09-22 PROCEDURE — 99214 OFFICE O/P EST MOD 30 MIN: CPT | Mod: S$GLB,,, | Performed by: INTERNAL MEDICINE

## 2022-09-22 PROCEDURE — 1160F RVW MEDS BY RX/DR IN RCRD: CPT | Mod: CPTII,S$GLB,, | Performed by: INTERNAL MEDICINE

## 2022-09-22 PROCEDURE — 99999 PR PBB SHADOW E&M-EST. PATIENT-LVL IV: ICD-10-PCS | Mod: PBBFAC,,, | Performed by: INTERNAL MEDICINE

## 2022-09-22 PROCEDURE — 3079F PR MOST RECENT DIASTOLIC BLOOD PRESSURE 80-89 MM HG: ICD-10-PCS | Mod: CPTII,S$GLB,, | Performed by: INTERNAL MEDICINE

## 2022-09-22 NOTE — ASSESSMENT & PLAN NOTE
[place] 3  Bed time 11pm  Wake time 8am  Using device  Last 90 days usage was 86.7%  > 4 hrs was 76.7%  benefits

## 2022-09-23 ENCOUNTER — PATIENT OUTREACH (OUTPATIENT)
Dept: ADMINISTRATIVE | Facility: HOSPITAL | Age: 52
End: 2022-09-23
Payer: COMMERCIAL

## 2022-09-23 NOTE — PROGRESS NOTES
Health Maintenance Due   Topic Date Due    COVID-19 Vaccine (3 - Booster for Pfizer series) 05/27/2021

## 2022-12-17 ENCOUNTER — PATIENT MESSAGE (OUTPATIENT)
Dept: ADMINISTRATIVE | Facility: OTHER | Age: 52
End: 2022-12-17
Payer: COMMERCIAL

## 2022-12-28 ENCOUNTER — OFFICE VISIT (OUTPATIENT)
Dept: PULMONOLOGY | Facility: CLINIC | Age: 52
End: 2022-12-28
Payer: COMMERCIAL

## 2022-12-28 DIAGNOSIS — G47.33 OSA ON CPAP: Primary | ICD-10-CM

## 2022-12-28 DIAGNOSIS — I10 ESSENTIAL HYPERTENSION: ICD-10-CM

## 2022-12-28 DIAGNOSIS — E66.01 SEVERE OBESITY WITH BODY MASS INDEX (BMI) OF 35.0 TO 39.9 WITH COMORBIDITY: ICD-10-CM

## 2022-12-28 PROCEDURE — 99213 PR OFFICE/OUTPT VISIT, EST, LEVL III, 20-29 MIN: ICD-10-PCS | Mod: 95,,, | Performed by: INTERNAL MEDICINE

## 2022-12-28 PROCEDURE — 4010F ACE/ARB THERAPY RXD/TAKEN: CPT | Mod: CPTII,95,, | Performed by: INTERNAL MEDICINE

## 2022-12-28 PROCEDURE — 99213 OFFICE O/P EST LOW 20 MIN: CPT | Mod: 95,,, | Performed by: INTERNAL MEDICINE

## 2022-12-28 PROCEDURE — 4010F PR ACE/ARB THEARPY RXD/TAKEN: ICD-10-PCS | Mod: CPTII,95,, | Performed by: INTERNAL MEDICINE

## 2022-12-28 NOTE — PROGRESS NOTES
The patient location is: HOME  The chief complaint leading to consultation is: PARAMJIT on CPAP    Visit type: audiovisual    Face to Face time with patient: 10 mins  15 minutes of total time spent on the encounter, which includes face to face time and non-face to face time preparing to see the patient (eg, review of tests), Obtaining and/or reviewing separately obtained history, Documenting clinical information in the electronic or other health record, Independently interpreting results (not separately reported) and communicating results to the patient/family/caregiver, or Care coordination (not separately reported).         Each patient to whom he or she provides medical services by telemedicine is:  (1) informed of the relationship between the physician and patient and the respective role of any other health care provider with respect to management of the patient; and (2) notified that he or she may decline to receive medical services by telemedicine and may withdraw from such care at any time.    Notes:                                                        Pulmonary Outpatient   Visit     Subjective:       Patient ID: Yoan Thomason is a 52 y.o. male.    Chief Complaint: Sleep Apnea        Yoan Thomason is 52 y.o.   Here to review CPAP  Moderate PARAMJIT  Goals of CPAP discussed  Weight loss  Using CPAP and benefits  Machine takes long   CPAP 8 cm , AHI was 1.0  Usage > 4 hrs was 76.7%  Recent travel    12/28/2022  Last visit 09/22/2022  Review CPAP  Device not available for download  Asked to park auto before virtual  Has Stacie device  Using and benefits  Needs to adjust RAMP time  FFM        The following portions of the patient's history were reviewed and updated as appropriate: He  has a past medical history of Hypertension, Morbid obesity (5/15/2017), and Tubular adenoma of colon (01/20/2020).  He does not have any pertinent problems on file.  He  has a past surgical history that includes sinus surgery x5 yrs  and Colonoscopy (N/A, 1/20/2020).  His family history includes Diabetes in his mother; Hypertension in his father.  He  reports that he has never smoked. He has never used smokeless tobacco. He reports that he does not drink alcohol and does not use drugs.  He has a current medication list which includes the following prescription(s): amlodipine, chlorthalidone, cyanocobalamin, fexofenadine, irbesartan, and multivitamin.  Current Outpatient Medications on File Prior to Visit   Medication Sig Dispense Refill    amLODIPine (NORVASC) 10 MG tablet Take 1 tablet (10 mg total) by mouth once daily. 30 tablet 11    chlorthalidone (HYGROTEN) 25 MG Tab TAKE 1/2 TABLET BY MOUTH EVERY DAY MAY INCREASE TO 1 TABLET IN 2 WEEKS 30 tablet 11    cyanocobalamin 2000 MCG tablet Take 2,000 mcg by mouth once daily.      fexofenadine (ALLEGRA) 180 MG tablet Take 180 mg by mouth once daily.      irbesartan (AVAPRO) 300 MG tablet Take 1 tablet (300 mg total) by mouth every evening. 30 tablet 11    multivitamin capsule Take 1 capsule by mouth once daily.       No current facility-administered medications on file prior to visit.     He has No Known Allergies..         Review of Systems   Constitutional:  Positive for fatigue.   Respiratory:  Positive for apnea.    All other systems reviewed and are negative.    Outpatient Encounter Medications as of 12/28/2022   Medication Sig Dispense Refill    amLODIPine (NORVASC) 10 MG tablet Take 1 tablet (10 mg total) by mouth once daily. 30 tablet 11    chlorthalidone (HYGROTEN) 25 MG Tab TAKE 1/2 TABLET BY MOUTH EVERY DAY MAY INCREASE TO 1 TABLET IN 2 WEEKS 30 tablet 11    cyanocobalamin 2000 MCG tablet Take 2,000 mcg by mouth once daily.      fexofenadine (ALLEGRA) 180 MG tablet Take 180 mg by mouth once daily.      irbesartan (AVAPRO) 300 MG tablet Take 1 tablet (300 mg total) by mouth every evening. 30 tablet 11    multivitamin capsule Take 1 capsule by mouth once daily.       No  facility-administered encounter medications on file as of 12/28/2022.       Objective:     Vital Signs (Most Recent)   ]  Wt Readings from Last 2 Encounters:   09/22/22 130.9 kg (288 lb 9.3 oz)   02/07/22 125.7 kg (277 lb 1.6 oz)       Physical Exam   Constitutional: He appears well-developed and well-nourished.   Nursing note and vitals reviewed.    Laboratory  Lab Results   Component Value Date    WBC 9.3 02/04/2021    RBC 5.95 (H) 02/04/2021    HGB 14.4 02/04/2021    HCT 46.7 02/04/2021    MCV 79 02/04/2021    MCH 24.2 (L) 02/04/2021    MCHC 30.8 (L) 02/04/2021    RDW 14.4 02/04/2021     02/04/2021    MPV Not available (A) 05/26/2009    GRAN 4.5 05/26/2009    GRAN 57.8 05/26/2009    LYMPH 2.5 02/04/2021    MONO 6 02/04/2021    MONO 0.5 02/04/2021    EOS 0.2 02/04/2021    BASO 0.1 02/04/2021    EOSINOPHIL 2 02/04/2021    BASOPHIL 1 02/04/2021       BMP  Lab Results   Component Value Date     02/07/2022    K 4.0 02/07/2022     02/07/2022    CO2 28 02/07/2022    BUN 25 (H) 02/07/2022    CREATININE 0.8 02/07/2022    CALCIUM 9.8 02/07/2022    ANIONGAP 10 02/07/2022    ESTGFRAFRICA >60.0 02/07/2022    EGFRNONAA >60.0 02/07/2022    AST 17 02/07/2022    ALT 11 02/07/2022    PROT 8.0 02/07/2022       No results found for: BNP    Lab Results   Component Value Date    TSH 1.220 02/04/2021       No results found for: SEDRATE    No results found for: CRP  No results found for: IGE     No results found for: ASPERGILLUS  No results found for: AFUMIGATUSCL     No results found for: ACE     Diagnostic Results:  I have personally reviewed today the following studies:  Best 30 Days  Compliance  Days of  therapy (>4h)  Average  daily use  time  Average  P95  Average  pressure  Average  AHI  Average  SNI  High leak time  (%)  9/22/2022 30 17 / 30 (57%) 17 / 30 (57%) 6:00 8.0 1 0%    Assessment/Plan:     Problem List Items Addressed This Visit       Essential hypertension    Severe obesity with body mass index (BMI)  of 35.0 to 39.9 with comorbidity    PARAMJIT on CPAP - Primary    Relevant Orders    CPAP/BIPAP SUPPLIES       Discussed therapeutic goals for positive airway pressure therapy(CPAP or BiPAP):   Ideal is usage 100% of nights for 6 - 8 hours per night. Minimum usage is 70% of night for at least 4 hours per night used. Pateint expressed understanding. All Questions answered.    Treatment Options for Sleep Disordered Breathing discussed:     [x]    Continuous Positive Airway Pressure (CPAP).  []    Surgical options  []    Oral appliances   []    Behavioral approaches.   []    Weight loss.   []    Avoiding alcohol and sedative medication.  []    Treat other underlying medical conditions eg. nasal allergies  []     INSPIRE  []     Provent/Theravent      Follow up in about 6 months (around 6/28/2023), or bring device for download, weight loss.    This note was prepared using voice recognition system and is likely to have sound alike errors that may have been overlooked even after proof reading.  Please call me with any questions    Discussed diagnosis, its evaluation, treatment and usual course. All questions answered.      Trevor Silva MD

## 2023-02-24 DIAGNOSIS — I10 ESSENTIAL HYPERTENSION: ICD-10-CM

## 2023-02-24 NOTE — TELEPHONE ENCOUNTER
Care Due:                  Date            Visit Type   Department     Provider  --------------------------------------------------------------------------------                                EP -                              PRIMARY      [facility] FAMILY  Last Visit: 02-      CARE ([facility])   MEDICINE       [facility]fan Deleon                              [facility]                              ANNUAL                              CHECKUP/PHY  [facility]  Next Visit: 02-      [facility]       Stefan Deleon                                                            Last  Test          Frequency    Reason                     Performed    Due Date  --------------------------------------------------------------------------------    CMP.........  12 months..  chlorthalidone,            02- 02-                             irbesartan...............    [facility] Embedded Care Gaps. Reference number: [id number]. 2/24/2023   3:46:23 PM CST

## 2023-02-25 RX ORDER — AMLODIPINE BESYLATE 10 MG/1
TABLET ORAL
Qty: 90 TABLET | Refills: 0 | Status: SHIPPED | OUTPATIENT
Start: 2023-02-25 | End: 2023-05-19

## 2023-02-27 ENCOUNTER — OFFICE VISIT (OUTPATIENT)
Dept: FAMILY MEDICINE | Facility: CLINIC | Age: 53
End: 2023-02-27
Payer: COMMERCIAL

## 2023-02-27 VITALS
TEMPERATURE: 98 F | DIASTOLIC BLOOD PRESSURE: 78 MMHG | HEIGHT: 70 IN | BODY MASS INDEX: 43.52 KG/M2 | OXYGEN SATURATION: 96 % | WEIGHT: 304 LBS | HEART RATE: 86 BPM | SYSTOLIC BLOOD PRESSURE: 115 MMHG | RESPIRATION RATE: 14 BRPM

## 2023-02-27 DIAGNOSIS — Z00.00 ROUTINE HISTORY AND PHYSICAL EXAMINATION OF ADULT: Primary | ICD-10-CM

## 2023-02-27 DIAGNOSIS — G47.33 OSA ON CPAP: ICD-10-CM

## 2023-02-27 DIAGNOSIS — I10 ESSENTIAL HYPERTENSION: ICD-10-CM

## 2023-02-27 DIAGNOSIS — Z12.5 SCREENING FOR PROSTATE CANCER: ICD-10-CM

## 2023-02-27 DIAGNOSIS — E66.01 SEVERE OBESITY WITH BODY MASS INDEX (BMI) OF 35.0 TO 39.9 WITH COMORBIDITY: ICD-10-CM

## 2023-02-27 PROCEDURE — 3078F PR MOST RECENT DIASTOLIC BLOOD PRESSURE < 80 MM HG: ICD-10-PCS | Mod: CPTII,S$GLB,, | Performed by: FAMILY MEDICINE

## 2023-02-27 PROCEDURE — 4010F PR ACE/ARB THEARPY RXD/TAKEN: ICD-10-PCS | Mod: CPTII,S$GLB,, | Performed by: FAMILY MEDICINE

## 2023-02-27 PROCEDURE — 3008F BODY MASS INDEX DOCD: CPT | Mod: CPTII,S$GLB,, | Performed by: FAMILY MEDICINE

## 2023-02-27 PROCEDURE — 1159F PR MEDICATION LIST DOCUMENTED IN MEDICAL RECORD: ICD-10-PCS | Mod: CPTII,S$GLB,, | Performed by: FAMILY MEDICINE

## 2023-02-27 PROCEDURE — 99999 PR PBB SHADOW E&M-EST. PATIENT-LVL IV: CPT | Mod: PBBFAC,,, | Performed by: FAMILY MEDICINE

## 2023-02-27 PROCEDURE — 4010F ACE/ARB THERAPY RXD/TAKEN: CPT | Mod: CPTII,S$GLB,, | Performed by: FAMILY MEDICINE

## 2023-02-27 PROCEDURE — 3074F SYST BP LT 130 MM HG: CPT | Mod: CPTII,S$GLB,, | Performed by: FAMILY MEDICINE

## 2023-02-27 PROCEDURE — 3078F DIAST BP <80 MM HG: CPT | Mod: CPTII,S$GLB,, | Performed by: FAMILY MEDICINE

## 2023-02-27 PROCEDURE — 3074F PR MOST RECENT SYSTOLIC BLOOD PRESSURE < 130 MM HG: ICD-10-PCS | Mod: CPTII,S$GLB,, | Performed by: FAMILY MEDICINE

## 2023-02-27 PROCEDURE — 99396 PR PREVENTIVE VISIT,EST,40-64: ICD-10-PCS | Mod: S$GLB,,, | Performed by: FAMILY MEDICINE

## 2023-02-27 PROCEDURE — 99999 PR PBB SHADOW E&M-EST. PATIENT-LVL IV: ICD-10-PCS | Mod: PBBFAC,,, | Performed by: FAMILY MEDICINE

## 2023-02-27 PROCEDURE — 3008F PR BODY MASS INDEX (BMI) DOCUMENTED: ICD-10-PCS | Mod: CPTII,S$GLB,, | Performed by: FAMILY MEDICINE

## 2023-02-27 PROCEDURE — 99396 PREV VISIT EST AGE 40-64: CPT | Mod: S$GLB,,, | Performed by: FAMILY MEDICINE

## 2023-02-27 PROCEDURE — 1159F MED LIST DOCD IN RCRD: CPT | Mod: CPTII,S$GLB,, | Performed by: FAMILY MEDICINE

## 2023-02-27 RX ORDER — CHLORTHALIDONE 25 MG/1
TABLET ORAL
Qty: 30 TABLET | Refills: 11 | OUTPATIENT
Start: 2023-02-27

## 2023-02-27 RX ORDER — CHLORTHALIDONE 25 MG/1
25 TABLET ORAL DAILY
Qty: 90 TABLET | Refills: 3 | Status: SHIPPED | OUTPATIENT
Start: 2023-02-27 | End: 2024-01-24

## 2023-02-27 RX ORDER — IRBESARTAN 300 MG/1
TABLET ORAL
Qty: 30 TABLET | Refills: 11 | OUTPATIENT
Start: 2023-02-27

## 2023-02-27 RX ORDER — IRBESARTAN 300 MG/1
300 TABLET ORAL NIGHTLY
Qty: 90 TABLET | Refills: 3 | Status: SHIPPED | OUTPATIENT
Start: 2023-02-27 | End: 2024-01-24

## 2023-02-27 NOTE — PROGRESS NOTES
Patient presents physical exam.  Hypertension controlled.  Sleep apnea onCPAP.  Obesity reviewed, loss recommended.  Colonoscopy up-to-date with previous tubular adenoma.  Did have upper respiratory symptoms starting about 3 weeks ago.  States COVID test negative twice, flu test negative.  Symptoms have improved though he still has some residual cough.    Yoan was seen today for annual exam.    Diagnoses and all orders for this visit:    Routine history and physical examination of adult  -     Cancel: Comprehensive Metabolic Panel; Future  -     Cancel: Lipid Panel; Future  -     Cancel: PSA, Screening; Future  -     Cancel: Hemoglobin A1C; Future  -     Comprehensive Metabolic Panel; Future  -     Lipid Panel; Future  -     Hemoglobin A1C; Future  -     PSA, Screening; Future  -     Comprehensive Metabolic Panel  -     Lipid Panel  -     Hemoglobin A1C  -     PSA, Screening    Essential hypertension  -     Cancel: Comprehensive Metabolic Panel; Future  -     Cancel: Lipid Panel; Future  -     chlorthalidone (HYGROTEN) 25 MG Tab; Take 1 tablet (25 mg total) by mouth once daily.    Severe obesity with body mass index (BMI) of 35.0 to 39.9 with comorbidity    PARAMJIT on CPAP    Screening for prostate cancer  -     Cancel: PSA, Screening; Future  -     PSA, Screening; Future  -     PSA, Screening    Other orders  -     irbesartan (AVAPRO) 300 MG tablet; Take 1 tablet (300 mg total) by mouth every evening.       Continue current medication.  Recommend COVID booster.    Anticipatory guidance: Don't smoke.  Healthy diet and regular exercise recommended.  He can use over-the-counter medication for cough for now.  Follow-up if not improving in the next week or 2.          Past Medical History:  Past Medical History:   Diagnosis Date    Hypertension     Morbid obesity 5/15/2017    Tubular adenoma of colon 01/20/2020     Past Surgical History:   Procedure Laterality Date    COLONOSCOPY N/A 1/20/2020    Procedure: COLONOSCOPY;   Surgeon: Joellen West MD;  Location: [facility];  Service: Endoscopy;  Laterality: N/A;    sinus surgery x5 yrs       Review of patient's allergies indicates:  No Known Allergies  Current Outpatient Medications on File Prior to Visit   Medication Sig Dispense Refill    amLODIPine (NORVASC) 10 MG tablet TAKE 1 TABLET BY MOUTH EVERY DAY 90 tablet 0    fexofenadine (ALLEGRA) 180 MG tablet Take 180 mg by mouth once daily.      multivitamin capsule Take 1 capsule by mouth once daily.      [DISCONTINUED] chlorthalidone (HYGROTEN) 25 MG Tab TAKE 1/2 TABLET BY MOUTH EVERY DAY MAY INCREASE TO 1 TABLET IN 2 WEEKS 30 tablet 11    [DISCONTINUED] irbesartan (AVAPRO) 300 MG tablet Take 1 tablet (300 mg total) by mouth every evening. 30 tablet 11    [DISCONTINUED] cyanocobalamin 2000 MCG tablet Take 2,000 mcg by mouth once daily.       No current facility-administered medications on file prior to visit.     Social History     Socioeconomic History    Marital status:    Tobacco Use    Smoking status: Never    Smokeless tobacco: Never   Substance and Sexual Activity    Alcohol use: No    Drug use: No     Family History   Problem Relation Age of Onset    Diabetes Mother     Hypertension Father        Answers submitted by the patient for this visit:  Review of Systems Questionnaire (Submitted on 2/23/2023)  activity change: No  unexpected weight change: Yes  neck pain: No  hearing loss: No  rhinorrhea: Yes  trouble swallowing: No  eye discharge: No  visual disturbance: No  chest tightness: No  wheezing: Yes  chest pain: No  palpitations: No  blood in stool: No  constipation: No  vomiting: No  diarrhea: No  polydipsia: No  polyuria: No  difficulty urinating: No  urgency: No  hematuria: No  joint swelling: No  arthralgias: No  headaches: No  weakness: No  confusion: No  dysphoric mood: No        Vitals:    02/27/23 1302   BP: 115/78   Pulse: 86   Resp: 14   Temp: 98.2 °F (36.8 °C)   SpO2: 96%   Weight: (!) 137.9 kg (304 lb)  "  Height: 5' 10" (1.778 m)       Wt Readings from Last 3 Encounters:   02/27/23 (!) 137.9 kg (304 lb)   09/22/22 130.9 kg (288 lb 9.3 oz)   02/07/22 125.7 kg (277 lb 1.6 oz)       OBJECTIVE:   APPEARANCE: Well nourished, well developed, in no acute distress.    HEAD: Normocephalic.  Atraumatic.  No sinus tenderness.  EYES:   Right eye: Pupil reactive.  Conjunctiva clear.    Left eye: Pupil reactive.  Conjunctiva clear.  EOMI.    EARS: TM's intact. Light reflex normal. No retraction or perforation.    NOSE:  clear.  MOUTH & THROAT:  No pharyngeal erythema or exudate. No lesions.  NECK: Supple. No bruits.  No JVD.  No cervical lymphadenopathy.  No thyromegaly.    CHEST: Breath sounds clear bilaterally.  Normal respiratory effort  CARDIOVASCULAR: Normal rate.  Regular rhythm.  No murmurs.  No rub.  No gallops.  ABDOMEN: Bowel sounds normal.  Soft.  No tenderness.  No organomegaly.  PERIPHERAL VASCULAR: No cyanosis.  No clubbing.  No edema.  NEUROLOGIC: No focal findings.  MENTAL STATUS: Alert.  Oriented x 3.        "

## 2023-05-26 ENCOUNTER — PATIENT MESSAGE (OUTPATIENT)
Dept: ADMINISTRATIVE | Facility: OTHER | Age: 53
End: 2023-05-26
Payer: COMMERCIAL

## 2023-07-06 ENCOUNTER — OFFICE VISIT (OUTPATIENT)
Dept: SLEEP MEDICINE | Facility: CLINIC | Age: 53
End: 2023-07-06
Payer: COMMERCIAL

## 2023-07-06 VITALS
OXYGEN SATURATION: 97 % | DIASTOLIC BLOOD PRESSURE: 80 MMHG | HEART RATE: 81 BPM | BODY MASS INDEX: 45.1 KG/M2 | WEIGHT: 315 LBS | RESPIRATION RATE: 17 BRPM | SYSTOLIC BLOOD PRESSURE: 130 MMHG | HEIGHT: 70 IN

## 2023-07-06 DIAGNOSIS — I10 ESSENTIAL HYPERTENSION: ICD-10-CM

## 2023-07-06 DIAGNOSIS — E66.01 SEVERE OBESITY WITH BODY MASS INDEX (BMI) OF 35.0 TO 39.9 WITH COMORBIDITY: ICD-10-CM

## 2023-07-06 DIAGNOSIS — G47.33 OSA ON CPAP: Primary | ICD-10-CM

## 2023-07-06 PROCEDURE — 99214 OFFICE O/P EST MOD 30 MIN: CPT | Mod: S$GLB,,, | Performed by: INTERNAL MEDICINE

## 2023-07-06 PROCEDURE — 1159F PR MEDICATION LIST DOCUMENTED IN MEDICAL RECORD: ICD-10-PCS | Mod: CPTII,S$GLB,, | Performed by: INTERNAL MEDICINE

## 2023-07-06 PROCEDURE — 3079F DIAST BP 80-89 MM HG: CPT | Mod: CPTII,S$GLB,, | Performed by: INTERNAL MEDICINE

## 2023-07-06 PROCEDURE — 3075F PR MOST RECENT SYSTOLIC BLOOD PRESS GE 130-139MM HG: ICD-10-PCS | Mod: CPTII,S$GLB,, | Performed by: INTERNAL MEDICINE

## 2023-07-06 PROCEDURE — 4010F PR ACE/ARB THEARPY RXD/TAKEN: ICD-10-PCS | Mod: CPTII,S$GLB,, | Performed by: INTERNAL MEDICINE

## 2023-07-06 PROCEDURE — 3079F PR MOST RECENT DIASTOLIC BLOOD PRESSURE 80-89 MM HG: ICD-10-PCS | Mod: CPTII,S$GLB,, | Performed by: INTERNAL MEDICINE

## 2023-07-06 PROCEDURE — 1159F MED LIST DOCD IN RCRD: CPT | Mod: CPTII,S$GLB,, | Performed by: INTERNAL MEDICINE

## 2023-07-06 PROCEDURE — 3075F SYST BP GE 130 - 139MM HG: CPT | Mod: CPTII,S$GLB,, | Performed by: INTERNAL MEDICINE

## 2023-07-06 PROCEDURE — 1160F PR REVIEW ALL MEDS BY PRESCRIBER/CLIN PHARMACIST DOCUMENTED: ICD-10-PCS | Mod: CPTII,S$GLB,, | Performed by: INTERNAL MEDICINE

## 2023-07-06 PROCEDURE — 99999 PR PBB SHADOW E&M-EST. PATIENT-LVL III: ICD-10-PCS | Mod: PBBFAC,,, | Performed by: INTERNAL MEDICINE

## 2023-07-06 PROCEDURE — 3008F PR BODY MASS INDEX (BMI) DOCUMENTED: ICD-10-PCS | Mod: CPTII,S$GLB,, | Performed by: INTERNAL MEDICINE

## 2023-07-06 PROCEDURE — 99999 PR PBB SHADOW E&M-EST. PATIENT-LVL III: CPT | Mod: PBBFAC,,, | Performed by: INTERNAL MEDICINE

## 2023-07-06 PROCEDURE — 99214 PR OFFICE/OUTPT VISIT, EST, LEVL IV, 30-39 MIN: ICD-10-PCS | Mod: S$GLB,,, | Performed by: INTERNAL MEDICINE

## 2023-07-06 PROCEDURE — 3008F BODY MASS INDEX DOCD: CPT | Mod: CPTII,S$GLB,, | Performed by: INTERNAL MEDICINE

## 2023-07-06 PROCEDURE — 1160F RVW MEDS BY RX/DR IN RCRD: CPT | Mod: CPTII,S$GLB,, | Performed by: INTERNAL MEDICINE

## 2023-07-06 PROCEDURE — 4010F ACE/ARB THERAPY RXD/TAKEN: CPT | Mod: CPTII,S$GLB,, | Performed by: INTERNAL MEDICINE

## 2023-07-06 NOTE — ASSESSMENT & PLAN NOTE
Patient would benefit from weight loss and has tried to set realistic goals to achieve success. Lifestyle changes were discussed on eating healthy, exercising at least 150 minutes weekly, and reducing sedentary behavior.   Discussed the risk factors associated with obesity: Arthritis/PARAMJIT/Diabetes/Fatty Liver/Cardiovascular disease/GERD/HTN/HLP.

## 2023-07-06 NOTE — PROGRESS NOTES
Pulmonary Outpatient   Visit     Subjective:       Patient ID: Yoan Thomason is a 53 y.o. male.    Chief Complaint: Sleep Apnea        Yoan Thomason is 52 y.o.   Here to review CPAP  Moderate PARAMJIT  Goals of CPAP discussed  Weight loss  Using CPAP and benefits  Machine takes long   CPAP 8 cm , AHI was 1.0  Usage > 4 hrs was 76.7%  Recent travel    12/28/2022  Last visit 09/22/2022  Review CPAP  Device not available for download  Asked to park auto before virtual  Has Niki device  Using and benefits  Needs to adjust RAMP time  FFM    07/06/2023  Followup APAP 4-20  Using device and benefits  Has NIKI machine  06/06/2023 to 07/05/2023  30 days : usage > 4 hrs was 70%    The following portions of the patient's history were reviewed and updated as appropriate: He  has a past medical history of Hypertension, Morbid obesity (5/15/2017), and Tubular adenoma of colon (01/20/2020).  He does not have any pertinent problems on file.  He  has a past surgical history that includes sinus surgery x5 yrs and Colonoscopy (N/A, 1/20/2020).  His family history includes Diabetes in his mother; Hypertension in his father.  He  reports that he has never smoked. He has never used smokeless tobacco. He reports that he does not drink alcohol and does not use drugs.  He has a current medication list which includes the following prescription(s): amlodipine, chlorthalidone, ergocalciferol (vitamin d2), fexofenadine, irbesartan, and multivitamin.  Current Outpatient Medications on File Prior to Visit   Medication Sig Dispense Refill    amLODIPine (NORVASC) 10 MG tablet TAKE 1 TABLET BY MOUTH EVERY DAY 90 tablet 3    chlorthalidone (HYGROTEN) 25 MG Tab Take 1 tablet (25 mg total) by mouth once daily. 90 tablet 3    ergocalciferol, vitamin D2, (VITAMIN D ORAL)       fexofenadine (ALLEGRA) 180 MG tablet Take 180 mg by mouth once daily.      irbesartan (AVAPRO) 300 MG tablet Take 1 tablet  "(300 mg total) by mouth every evening. 90 tablet 3    multivitamin capsule Take 1 capsule by mouth once daily.       No current facility-administered medications on file prior to visit.     He is allergic to animal dander and house dust..         Review of Systems   Constitutional:  Negative for fatigue.   Respiratory:  Negative for apnea.    All other systems reviewed and are negative.    Outpatient Encounter Medications as of 7/6/2023   Medication Sig Dispense Refill    amLODIPine (NORVASC) 10 MG tablet TAKE 1 TABLET BY MOUTH EVERY DAY 90 tablet 3    chlorthalidone (HYGROTEN) 25 MG Tab Take 1 tablet (25 mg total) by mouth once daily. 90 tablet 3    ergocalciferol, vitamin D2, (VITAMIN D ORAL)       fexofenadine (ALLEGRA) 180 MG tablet Take 180 mg by mouth once daily.      irbesartan (AVAPRO) 300 MG tablet Take 1 tablet (300 mg total) by mouth every evening. 90 tablet 3    multivitamin capsule Take 1 capsule by mouth once daily.       No facility-administered encounter medications on file as of 7/6/2023.       Objective:     Vital Signs (Most Recent)  Vital Signs  Pulse: 81  Resp: 17  SpO2: 97 %  BP: 130/80  Height and Weight  Height: 5' 10" (177.8 cm)  Weight: (!) 144.2 kg (317 lb 14.5 oz)  BSA (Calculated - sq m): 2.67 sq meters  BMI (Calculated): 45.6  Weight in (lb) to have BMI = 25: 173.9]  Wt Readings from Last 2 Encounters:   07/06/23 (!) 144.2 kg (317 lb 14.5 oz)   02/27/23 (!) 137.9 kg (304 lb)       Physical Exam   Constitutional: He is oriented to person, place, and time. He appears well-developed and well-nourished.   HENT:   Head: Normocephalic.   Mouth/Throat: Mallampati Score: II.   Neck: No JVD present.   Cardiovascular: Normal rate and intact distal pulses.   No murmur heard.  Pulmonary/Chest: Normal expansion, effort normal and breath sounds normal.   Abdominal: Soft. Bowel sounds are normal.   Musculoskeletal:         General: No edema. Normal range of motion.      Cervical back: Normal range of " motion and neck supple.   Lymphadenopathy:     He has no axillary adenopathy.   Neurological: He is alert and oriented to person, place, and time.   Skin: Skin is warm and dry.   Psychiatric: He has a normal mood and affect.   Nursing note and vitals reviewed.    Laboratory  Lab Results   Component Value Date    WBC 9.3 02/04/2021    RBC 5.95 (H) 02/04/2021    HGB 14.4 02/04/2021    HCT 46.7 02/04/2021    MCV 79 02/04/2021    MCH 24.2 (L) 02/04/2021    MCHC 30.8 (L) 02/04/2021    RDW 14.4 02/04/2021     02/04/2021    MPV Not available (A) 05/26/2009    GRAN 4.5 05/26/2009    GRAN 57.8 05/26/2009    LYMPH 2.5 02/04/2021    MONO 6 02/04/2021    MONO 0.5 02/04/2021    EOS 0.2 02/04/2021    BASO 0.1 02/04/2021    EOSINOPHIL 2 02/04/2021    BASOPHIL 1 02/04/2021       BMP  Lab Results   Component Value Date     02/07/2022    K 4.0 02/07/2022     02/07/2022    CO2 28 02/07/2022    BUN 25 (H) 02/07/2022    CREATININE 0.8 02/07/2022    CALCIUM 9.8 02/07/2022    ANIONGAP 10 02/07/2022    ESTGFRAFRICA >60.0 02/07/2022    EGFRNONAA >60.0 02/07/2022    AST 17 02/07/2022    ALT 11 02/07/2022    PROT 8.0 02/07/2022       No results found for: BNP    Lab Results   Component Value Date    TSH 1.220 02/04/2021       No results found for: SEDRATE    No results found for: CRP  No results found for: IGE     No results found for: ASPERGILLUS  No results found for: AFUMIGATUSCL     No results found for: ACE     Diagnostic Results:    SD Card Compliance Report for Yoan Thomason Date generated: 07/06/2023  Location Information  Organization: [facility]  Address 1: [address] [place] [address]  Address 2:  Country: [place]  City: [place]  State: [place]  ZIP code: [id number]  Phone: [phone]  Physician Information  Name:  Organization:  Address 1:  Address 2:  Country:  City:  State:  ZIP code:  Phone:  Patient Information  Patient name: Yoan Thomason  Patient ID: [id number]  Device serial:  [id number]  Gender: Male  YOB: 1970  Therapy start: 2/15/2022  Address 1:  Address 2:  Country:  City: [place]  State: [place]  ZIP code:  Device Settings  Mode Auto-CPAP  Minimum APAP 4.0 cmH2O  Maximum APAP 20.0 cmH2O  Ramp pressure 4.0 cmH2O  Compliance Summary  Start date 06/06/2023  End date 07/05/2023  Days in range 30  Days of use 21 / 30 days (70.0%)  Total usage in date range 6 days, 14 hours, 27 minutes  Average usage (on days used) 7 hours, 33 minutes  Average usage (on all days) 5 hours, 17 minutes  Maximum usage 9 hours, 11 minutes  Minimum usage 6 hours, 21 minutes  Days with usage >= 4 hours 70.0% (21 of 30 days)        Assessment/Plan:     Problem List Items Addressed This Visit       Essential hypertension     Stable on Norvasc, Hygroten, Avapro         Severe obesity with body mass index (BMI) of 35.0 to 39.9 with comorbidity     Patient would benefit from weight loss and has tried to set realistic goals to achieve success. Lifestyle changes were discussed on eating healthy, exercising at least 150 minutes weekly, and reducing sedentary behavior.   Discussed the risk factors associated with obesity: Arthritis/PARAMJIT/Diabetes/Fatty Liver/Cardiovascular disease/GERD/HTN/HLP.           PARAMJIT on CPAP - Primary     [place] 2  Bed time 11pm  Wake time 8am  Using device and benefits  Last 30 days usage was 70%  > 4 hrs was 70%       Average AHI 1.0         Relevant Orders    CPAP/BIPAP SUPPLIES       Discussed therapeutic goals for positive airway pressure therapy(CPAP or BiPAP):   Ideal is usage 100% of nights for 6 - 8 hours per night. Minimum usage is 70% of night for at least 4 hours per night used. Pateint expressed understanding. All Questions answered.           Follow up in about 1 year (around 7/6/2024), or weight loss, download.    This note was prepared using voice recognition system and is likely to have sound alike errors that may have been overlooked even after proof reading.  Please  call me with any questions    Discussed diagnosis, its evaluation, treatment and usual course. All questions answered.      Trevor Silva MD

## 2023-07-06 NOTE — ASSESSMENT & PLAN NOTE
[place] 2  Bed time 11pm  Wake time 8am  Using device and benefits  Last 30 days usage was 70%  > 4 hrs was 70%       Average AHI 1.0

## 2024-01-24 DIAGNOSIS — I10 ESSENTIAL HYPERTENSION: ICD-10-CM

## 2024-01-24 RX ORDER — IRBESARTAN 300 MG/1
300 TABLET ORAL NIGHTLY
Qty: 90 TABLET | Refills: 0 | Status: SHIPPED | OUTPATIENT
Start: 2024-01-24 | End: 2024-03-14 | Stop reason: SDUPTHER

## 2024-01-24 RX ORDER — CHLORTHALIDONE 25 MG/1
25 TABLET ORAL
Qty: 90 TABLET | Refills: 0 | Status: SHIPPED | OUTPATIENT
Start: 2024-01-24 | End: 2024-03-26

## 2024-01-24 NOTE — TELEPHONE ENCOUNTER
No care due was identified.  [facility] Embedded Care Due Messages. Reference number: [id number].   1/24/2024 11:09:06 AM CST

## 2024-01-24 NOTE — TELEPHONE ENCOUNTER
Refill Decision Note   Yoan Thomason  is requesting a refill authorization.  Brief Assessment and Rationale for Refill:  Approve     Medication Therapy Plan:         Comments:     Note composed:12:19 PM 01/24/2024             Appointments     Last Visit   2/27/2023 Stefan Deleon MD   Next Visit   Visit date not found Stefan Deleon MD

## 2024-02-07 ENCOUNTER — PATIENT MESSAGE (OUTPATIENT)
Dept: SLEEP MEDICINE | Facility: CLINIC | Age: 54
End: 2024-02-07
Payer: COMMERCIAL

## 2024-02-07 DIAGNOSIS — G47.33 OSA (OBSTRUCTIVE SLEEP APNEA): Primary | ICD-10-CM

## 2024-03-14 ENCOUNTER — PATIENT MESSAGE (OUTPATIENT)
Dept: FAMILY MEDICINE | Facility: CLINIC | Age: 54
End: 2024-03-14
Payer: COMMERCIAL

## 2024-03-14 RX ORDER — IRBESARTAN 300 MG/1
300 TABLET ORAL NIGHTLY
Qty: 90 TABLET | Refills: 0 | Status: SHIPPED | OUTPATIENT
Start: 2024-03-14

## 2024-03-14 NOTE — TELEPHONE ENCOUNTER
Care Due:                  Date            Visit Type   Department     Provider  --------------------------------------------------------------------------------                                MYCHART                              ANNUAL                              CHECKUP/PHY  [facility]  Last Visit: 02-      [facility]       Stefan Deleon  Next Visit: None Scheduled  None         None Found                                                            Last  Test          Frequency    Reason                     Performed    Due Date  --------------------------------------------------------------------------------    Office Visit  15 months..  chlorthalidone,            02- 05-                             irbesartan...............    [employer] Embedded Care Due Messages. Reference number: [id number].   3/14/2024 8:55:34 AM CDT

## 2024-03-25 DIAGNOSIS — I10 ESSENTIAL HYPERTENSION: ICD-10-CM

## 2024-03-25 NOTE — TELEPHONE ENCOUNTER
No care due was identified.  [facility] Embedded Care Due Messages. Reference number: [id number].   3/25/2024 12:59:15 PM CDT

## 2024-03-26 RX ORDER — CHLORTHALIDONE 25 MG/1
25 TABLET ORAL
Qty: 90 TABLET | Refills: 0 | Status: SHIPPED | OUTPATIENT
Start: 2024-03-26

## 2024-03-26 RX ORDER — AMLODIPINE BESYLATE 10 MG/1
TABLET ORAL
Qty: 90 TABLET | Refills: 0 | Status: SHIPPED | OUTPATIENT
Start: 2024-03-26

## 2024-07-17 ENCOUNTER — PATIENT MESSAGE (OUTPATIENT)
Dept: ADMINISTRATIVE | Facility: OTHER | Age: 54
End: 2024-07-17
Payer: COMMERCIAL

## 2025-01-28 ENCOUNTER — PATIENT MESSAGE (OUTPATIENT)
Dept: SLEEP MEDICINE | Facility: CLINIC | Age: 55
End: 2025-01-28
Payer: COMMERCIAL

## 2025-02-05 ENCOUNTER — TELEPHONE (OUTPATIENT)
Dept: PULMONOLOGY | Facility: CLINIC | Age: 55
End: 2025-02-05
Payer: COMMERCIAL

## 2025-02-05 NOTE — TELEPHONE ENCOUNTER
Left vm informing pt that upcoming appt has been rescheduled due to provider not being in clinic. Return call if appt date and time does not work.

## 2025-02-14 ENCOUNTER — OFFICE VISIT (OUTPATIENT)
Dept: PULMONOLOGY | Facility: CLINIC | Age: 55
End: 2025-02-14
Payer: COMMERCIAL

## 2025-02-14 VITALS
HEART RATE: 85 BPM | BODY MASS INDEX: 42.72 KG/M2 | RESPIRATION RATE: 20 BRPM | DIASTOLIC BLOOD PRESSURE: 78 MMHG | OXYGEN SATURATION: 97 % | WEIGHT: 305.13 LBS | HEIGHT: 71 IN | SYSTOLIC BLOOD PRESSURE: 120 MMHG

## 2025-02-14 DIAGNOSIS — G47.33 OSA ON CPAP: Primary | ICD-10-CM

## 2025-02-14 DIAGNOSIS — I10 ESSENTIAL HYPERTENSION: ICD-10-CM

## 2025-02-14 RX ORDER — CETIRIZINE HYDROCHLORIDE 10 MG/1
1 TABLET ORAL EVERY MORNING
COMMUNITY

## 2025-02-14 RX ORDER — LORATADINE 10 MG/1
1 TABLET ORAL EVERY MORNING
COMMUNITY

## 2025-02-14 RX ORDER — ACETAMINOPHEN 500 MG
1 TABLET ORAL DAILY
COMMUNITY

## 2025-02-14 NOTE — PROGRESS NOTES
Pulmonary Outpatient   Visit     Subjective:       Patient ID: Yoan Thomason is a 55 y.o. male.    Chief Complaint: Sleep Apnea        Yoan Thomason is 52 y.o.   Here to review CPAP  Moderate PARAMJIT  Goals of CPAP discussed  Weight loss  Using CPAP and benefits  Machine takes long   CPAP 8 cm , AHI was 1.0  Usage > 4 hrs was 76.7%  Recent travel    12/28/2022  Last visit 09/22/2022  Review CPAP  Device not available for download  Asked to park auto before virtual  Has Niki device  Using and benefits  Needs to adjust RAMP time  FFM    07/06/2023  Followup APAP 4-20  Using device and benefits  Has NIKI machine  06/06/2023 to 07/05/2023  30 days : usage > 4 hrs was 70%      02/14/2025   Follow-up visit   Doing well   Asks for new machine   Machine was prescribed in 2021   No snoring no daytime sleepiness   Using machine and benefits   New supplies ordered   Says the digital read out on machine is not working well   Machine has pressure discrepancies during the night subjectively per patient      The following portions of the patient's history were reviewed and updated as appropriate: He  has a past medical history of Hypertension, Morbid obesity (5/15/2017), and Tubular adenoma of colon (01/20/2020).  He does not have any pertinent problems on file.  He  has a past surgical history that includes sinus surgery x5 yrs and Colonoscopy (N/A, 1/20/2020).  His family history includes Diabetes in his mother; Hypertension in his father.  He  reports that he has never smoked. He has never used smokeless tobacco. He reports that he does not drink alcohol and does not use drugs.  He has a current medication list which includes the following prescription(s): amlodipine, cetirizine, chlorthalidone, cholecalciferol (vitamin d3), ergocalciferol (vitamin d2), fexofenadine, irbesartan, loratadine, and multivitamin.  Current Outpatient Medications on File Prior to Visit    Medication Sig Dispense Refill    amLODIPine (NORVASC) 10 MG tablet TAKE 1 TABLET BY MOUTH EVERY DAY 90 tablet 0    cetirizine (ZYRTEC) 10 MG tablet Take 1 tablet by mouth every morning.      chlorthalidone (HYGROTEN) 25 MG Tab Take 1 tablet (25 mg total) by mouth once daily. 90 tablet 1    cholecalciferol, vitamin D3, (VITAMIN D3) 50 mcg (2,000 unit) Cap capsule Take 1 capsule by mouth once daily.      ergocalciferol, vitamin D2, (VITAMIN D ORAL)       fexofenadine (ALLEGRA) 180 MG tablet Take 180 mg by mouth once daily.      irbesartan (AVAPRO) 300 MG tablet Take 1 tablet (300 mg total) by mouth every evening. 90 tablet 1    loratadine (CLARITIN) 10 mg tablet Take 1 tablet by mouth every morning.      multivitamin capsule Take 1 capsule by mouth once daily.       No current facility-administered medications on file prior to visit.     He is allergic to animal dander and house dust..         Review of Systems   Constitutional:  Negative for fatigue.   Respiratory:  Negative for apnea.    All other systems reviewed and are negative.      Outpatient Encounter Medications as of 2/14/2025   Medication Sig Dispense Refill    amLODIPine (NORVASC) 10 MG tablet TAKE 1 TABLET BY MOUTH EVERY DAY 90 tablet 0    cetirizine (ZYRTEC) 10 MG tablet Take 1 tablet by mouth every morning.      chlorthalidone (HYGROTEN) 25 MG Tab Take 1 tablet (25 mg total) by mouth once daily. 90 tablet 1    cholecalciferol, vitamin D3, (VITAMIN D3) 50 mcg (2,000 unit) Cap capsule Take 1 capsule by mouth once daily.      ergocalciferol, vitamin D2, (VITAMIN D ORAL)       fexofenadine (ALLEGRA) 180 MG tablet Take 180 mg by mouth once daily.      irbesartan (AVAPRO) 300 MG tablet Take 1 tablet (300 mg total) by mouth every evening. 90 tablet 1    loratadine (CLARITIN) 10 mg tablet Take 1 tablet by mouth every morning.      multivitamin capsule Take 1 capsule by mouth once daily.       No facility-administered encounter medications on file as of  "2/14/2025.       Objective:     Vital Signs (Most Recent)  Vital Signs  Pulse: 85  Resp: 20  SpO2: 97 %  BP: 120/78  Pain Score: 0-No pain  Height and Weight  Height: 5' 11" (180.3 cm)  Weight: (!) 138.4 kg (305 lb 1.9 oz)  BSA (Calculated - sq m): 2.63 sq meters  BMI (Calculated): 42.6  Weight in (lb) to have BMI = 25: 178.9]  Wt Readings from Last 2 Encounters:   02/14/25 (!) 138.4 kg (305 lb 1.9 oz)   07/06/23 (!) 144.2 kg (317 lb 14.5 oz)       Physical Exam   Constitutional: He is oriented to person, place, and time. He appears well-developed and well-nourished.   HENT:   Head: Normocephalic.   Mouth/Throat: Mallampati Score: II.   Neck: No JVD present.   Cardiovascular: Normal rate and intact distal pulses.   No murmur heard.  Pulmonary/Chest: Normal expansion, effort normal and breath sounds normal.   Abdominal: Soft. Bowel sounds are normal.   Musculoskeletal:         General: No edema. Normal range of motion.      Cervical back: Normal range of motion and neck supple.   Lymphadenopathy:     He has no axillary adenopathy.   Neurological: He is alert and oriented to person, place, and time.   Skin: Skin is warm and dry.   Psychiatric: He has a normal mood and affect.   Nursing note and vitals reviewed.      Laboratory  Lab Results   Component Value Date    WBC 9.3 02/04/2021    RBC 5.95 (H) 02/04/2021    HGB 14.4 02/04/2021    HCT 46.7 02/04/2021    MCV 79 02/04/2021    MCH 24.2 (L) 02/04/2021    MCHC 30.8 (L) 02/04/2021    RDW 14.4 02/04/2021     02/04/2021    MPV Not available (A) 05/26/2009    GRAN 4.5 05/26/2009    GRAN 57.8 05/26/2009    LYMPH 2.5 02/04/2021    MONO 6 02/04/2021    MONO 0.5 02/04/2021    EOS 0.2 02/04/2021    BASO 0.1 02/04/2021    EOSINOPHIL 2 02/04/2021    BASOPHIL 1 02/04/2021       BMP  Lab Results   Component Value Date     01/24/2025    K 3.5 01/24/2025    CL 98 01/24/2025    CO2 31 (H) 01/24/2025    BUN 26 (H) 01/24/2025    CREATININE 0.83 01/24/2025    CALCIUM 9.7 " "2025    ANIONGAP 10 2022    ESTGFRAFRICA >60.0 2022    EGFRNONAA >60.0 2022    AST 17 2022    ALT 11 2022    PROT 8.0 2022       No results found for: "BNP"    Lab Results   Component Value Date    TSH 1.220 2021       No results found for: "SEDRATE"    No results found for: "CRP"  No results found for: "IGE"     No results found for: "ASPERGILLUS"  No results found for: "AFUMIGATUSCL"     No results found for: "ACE"     Diagnostic Results:    11/15/2024 - 2025  : 1970  Age: 54 years  Compliance Report  Usage 11/15/2024 - 2025  Usage days 88/90 days (97.78%)  >= 4 hours 86 days (95.56%)  < 4 hours 2 days (2.22%)  Other 2 days (2.22%)  Average usage (total days) 7 hours 7 minutes  Average usage (days used) 7 hours 17 minutes  Best 30 2024 - 01/10/2025 30/30 days (100%)  Niki II Auto-CPAP [id number]  Mode: Auto-CPAP Settings Collected: 2025 SN Assigned: 02/15/2022 - Present  Ramp/Initial P (cmH2O): 4.0 Min Pressure (cmH2O): 4.0 Max Pressure (cmH2O): 20.0  Reslex: Off Reslex patient: Off  Therapy  Avg Pressure (cmH2O) 5.3 Avg P95 (cmH2O) 7.1  Avg Leak (L/min) 16.7 Avg High Leak Minutes 4  Avg AHI 0.8 Avg AASHISH 0.2        Assessment/Plan:     Problem List Items Addressed This Visit       Essential hypertension    BMI 40.0-44.9, adult    PARAMJIT on CPAP - Primary        2025   EPWORTH SLEEPINESS SCALE   Sitting and reading 0    Watching TV 0    Sitting, inactive in a public place (e.g. a theatre or a meeting) 0    As a passenger in a car for an hour without a break 0    Lying down to rest in the afternoon when circumstances permit 2    Sitting and talking to someone 0    Sitting quietly after a lunch without alcohol 1    In a car, while stopped for a few minutes in traffic 0    Total score 3        Patient-reported          NIKI 2    Bed time 11pm  Wake time 8am  Using device and benefits    Data 11/15/2024 to 2025  Last 30 " days usage was 95.56%  > 4 hrs was 95.56%   APAP 4-20    Average AHI 0.8         Relevant Orders    CPAP FOR HOME USE    CPAP/BIPAP SUPPLIES       Discussed therapeutic goals for positive airway pressure therapy(CPAP or BiPAP):   Ideal is usage 100% of nights for 6 - 8 hours per night. Minimum usage is 70% of night for at least 4 hours per night used. Pateint expressed understanding. All Questions answered.           Follow up in about 1 year (around 2/14/2026), or new CPAP.    This note was prepared using voice recognition system and is likely to have sound alike errors that may have been overlooked even after proof reading.  Please call me with any questions    Discussed diagnosis, its evaluation, treatment and usual course. All questions answered.      Trevor Silva MD

## 2025-02-14 NOTE — ASSESSMENT & PLAN NOTE
2/7/2025   EPWORTH SLEEPINESS SCALE   Sitting and reading 0    Watching TV 0    Sitting, inactive in a public place (e.g. a theatre or a meeting) 0    As a passenger in a car for an hour without a break 0    Lying down to rest in the afternoon when circumstances permit 2    Sitting and talking to someone 0    Sitting quietly after a lunch without alcohol 1    In a car, while stopped for a few minutes in traffic 0    Total score 3        Patient-reported          NIKI 2    Bed time 11pm  Wake time 8am  Using device and benefits    Data 11/15/2024 to 02/12/2025  Last 30 days usage was 95.56%  > 4 hrs was 95.56%   APAP 4-20    Average AHI 0.8

## 2025-04-17 DIAGNOSIS — I10 ESSENTIAL HYPERTENSION: ICD-10-CM

## 2025-04-17 RX ORDER — AMLODIPINE BESYLATE 10 MG/1
TABLET ORAL
Qty: 90 TABLET | Refills: 0 | OUTPATIENT
Start: 2025-04-17

## 2025-04-17 NOTE — TELEPHONE ENCOUNTER
Refill Decision Note   Yoan Thomason  is requesting a refill authorization.  Brief Assessment and Rationale for Refill:  Quick Discontinue     Medication Therapy Plan:  PCP is Perez Brown      Comments:     Note composed:2:30 PM 04/17/2025             Appointments     Last Visit   2/27/2023 Stefan Deleon MD   Next Visit   Visit date not found Stefan Deleon MD